# Patient Record
Sex: MALE | URBAN - METROPOLITAN AREA
[De-identification: names, ages, dates, MRNs, and addresses within clinical notes are randomized per-mention and may not be internally consistent; named-entity substitution may affect disease eponyms.]

---

## 2018-03-14 ENCOUNTER — OFFICE VISIT (OUTPATIENT)
Dept: INTERNAL MEDICINE CLINIC | Age: 43
End: 2018-03-14

## 2018-03-14 VITALS
HEIGHT: 70 IN | RESPIRATION RATE: 16 BRPM | HEART RATE: 89 BPM | BODY MASS INDEX: 28.77 KG/M2 | SYSTOLIC BLOOD PRESSURE: 184 MMHG | WEIGHT: 201 LBS | TEMPERATURE: 98.6 F | OXYGEN SATURATION: 96 % | DIASTOLIC BLOOD PRESSURE: 120 MMHG

## 2018-03-14 DIAGNOSIS — F10.20 UNCOMPLICATED ALCOHOL DEPENDENCE (HCC): ICD-10-CM

## 2018-03-14 DIAGNOSIS — Z00.00 WELL ADULT EXAM: Primary | ICD-10-CM

## 2018-03-14 DIAGNOSIS — F41.9 ANXIETY: ICD-10-CM

## 2018-03-14 DIAGNOSIS — I10 ESSENTIAL HYPERTENSION: ICD-10-CM

## 2018-03-14 RX ORDER — SERTRALINE HYDROCHLORIDE 50 MG/1
TABLET, FILM COATED ORAL
Qty: 30 TAB | Refills: 0 | Status: SHIPPED | OUTPATIENT
Start: 2018-03-14 | End: 2018-03-29 | Stop reason: ALTCHOICE

## 2018-03-14 RX ORDER — PROPRANOLOL HYDROCHLORIDE 10 MG/1
10 TABLET ORAL 3 TIMES DAILY
Qty: 90 TAB | Refills: 0 | Status: SHIPPED | OUTPATIENT
Start: 2018-03-14 | End: 2018-03-29

## 2018-03-14 RX ORDER — NALTREXONE HYDROCHLORIDE 50 MG/1
50 TABLET, FILM COATED ORAL DAILY
Qty: 30 TAB | Refills: 1 | Status: SHIPPED | OUTPATIENT
Start: 2018-03-14 | End: 2018-03-29 | Stop reason: SDUPTHER

## 2018-03-14 NOTE — PROGRESS NOTES
Marci Kohli is a 38 y.o. male and presents with Establish Care and Anxiety  . Subjective:  Pt presents for new patient visit. His wife, Livia Nava, just established with me and rec  to come to [facility]. anxiety  2 years ago pt reports he had a panic attack  Went to hospital thinking he was having a heart attack  pepsid given  Was at a very stressful job  Left prior postion due to [place] to do things decrased. He does not feel as motivated as before    Exercises through his work  Enjoys playing with his 1yo, 4yo and 10 yo  Sleeps 5-6 hours does not have problems going to sleep wakes up at 3 am due to prior schedule and can't go back to sleep. Coffee 2-4 cups Mon- Friday & Coffee 6 cups sat and Sunday likes the taste of it  When wakes ready to go    7.5 /10  Energy level 4/10  No sleep apnea but snores at night  Smoking for 20 years, etoh 3/night see SA profile  anxeity not so pervasive but now has become more of a problem    Plays with children all the time  Review of Systems  Constitutional: negative for fevers, chills, anorexia and weight loss  Eyes:   negative for visual disturbance and irritation  ENT:   negative for tinnitus,sore throat,nasal congestion,ear pains. hoarseness  Respiratory:  negative for cough, hemoptysis, dyspnea,wheezing  CV:   negative for chest pain, palpitations, lower extremity edema  GI:   negative for nausea, vomiting, diarrhea, abdominal pain,melena  Endo:               negative for polyuria,polydipsia,polyphagia,heat intolerance  Genitourinary: negative for frequency, dysuria and hematuria  Integument:  negative for rash and pruritus  Hematologic:  negative for easy bruising and gum/nose bleeding  Musculoskel: negative for myalgias, arthralgias, back pain, muscle weakness, joint pain  Neurological:  negative for headaches, dizziness, vertigo, memory problems and gait   Behavl/Psych: negative for feelings of anxiety, depression, mood changes    History reviewed. No pertinent past medical history.   Past Surgical History:   Procedure Laterality Date    HX ORTHOPAEDIC      knee surgery     Social History     Social History    Marital status: UNKNOWN     Spouse name: N/A    Number of children: N/A    Years of education: N/A     Social History Main Topics    Smoking status: Current Every Day Smoker     Packs/day: 1.00     Years: 20.00     Types: Cigarettes    Smokeless tobacco: Never Used    Alcohol use 1.8 oz/week     3 Cans of beer per week      Comment: 3 beers/day, 5 on friday and sat    Drug use: No    Sexual activity: Yes     Partners: Female     Other Topics Concern    None     Social History Narrative    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    Landscaping             wife healthy Isauro Kraus    3 children : 2, 3, 5yo     Family History   Problem Relation Age of Onset    Thyroid Disease Mother     Migraines Sister     Anxiety Sister        No Known Allergies    Objective:  Visit Vitals    BP (!) 184/120 (BP 1 Location: Left arm, BP Patient Position: Sitting)    Pulse 89    Temp 98.6 °F (37 °C) (Oral)    Resp 16    Ht 5' 10\" (1.778 m)    Wt 201 lb (91.2 kg)    SpO2 96%    BMI 28.84 kg/m2     Physical Exam:   General appearance - alert, well appearing, and in no distress  Mental status - alert, oriented to person, place, and time  EYE-FRANK, EOMI, corneas normal, no foreign bodies, visual acuity normal both eyes, no periorbital cellulitis  ENT-ENT exam normal, no neck nodes or sinus tenderness  Nose - normal and patent, no erythema, discharge or polyps  Mouth - mucous membranes moist, pharynx normal without lesions  Neck - supple, no significant adenopathy   Chest - clear to auscultation, no wheezes, rales or rhonchi, symmetric air entry   Heart - normal rate, regular rhythm, normal S1, S2, no murmurs, rubs, clicks or gallops   Abdomen - soft, nontender, nondistended, no masses or organomegaly  Lymph- no adenopathy palpable  Ext-peripheral pulses normal, no pedal edema, no clubbing or cyanosis  Skin-Warm and dry. no hyperpigmentation, vitiligo, or suspicious lesions  Neuro -alert, oriented, normal speech, no focal findings or movement disorder noted  Neck-normal C-spine, no tenderness, full ROM without pain      No results found for this or any previous visit. 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 Well adult exam  -     CBC W/O DIFF  -     METABOLIC PANEL, COMPREHENSIVE  -     LIPID PANEL  -     TSH 3RD GENERATION    Aside from pt's physical exam I spent 40 min with pt and > 50% of the time was spent in management and counseling regarding pts anxiety not treated as well as etoh use and new dx of hypertension. I discussed with pt that he has anxiety and would like to treat with CBT and rx meds. Hopefully can eventually get him off meds. discssed CBT does very well for anxiety and he is cognitvely based and already doing some self talk thru sitautions so hopefully can pursue this. I did discuss with him that his sleep is critical right now and the beer, coffee and smoking contributing to likely broken REM with resulting fatigue. As a result will try start of sertraline for anxieyt and start his weaning process for his etoh. I discussed the lim method in detail and he is very interested in trying. He has tried to stop etoh but has significant cravings.  disucussed with pt this method and will try. Will start sertraline first and if no side effects will try the naltrexone. He needs follow up in 2-3 weeks. HTN, new dx. Will do labs. disucssed etoh and smoking can cause labile htn. Will treat with propanolol to addres bp and anxiety sx. Will work on cig and caffeine over time once BP, anxiety and etoh more stabilized. 2. Essential hypertension  -     propranolol (INDERAL) 10 mg tablet; Take 1 Tab by mouth three (3) times daily. Indications: hypertension    3. Anxiety  -     sertraline (ZOLOFT) 50 mg tablet; Take 1/2 tablet for 4 days then increase to 1 tablet if tolerated  -     propranolol (INDERAL) 10 mg tablet; Take 1 Tab by mouth three (3) times daily. Indications: hypertension    4. Uncomplicated alcohol dependence (HCC)  -     naltrexone (DEPADE) 50 mg tablet; Take 1 Tab by mouth daily. 1-2 hours prior to etoh  Indications: alcoholism    2-3 week followup    This note will not be viewable in [place].

## 2018-03-14 NOTE — MR AVS SNAPSHOT
Brandy Seay 
 
 
 [address] 
[phone] Patient: Sohail Kaye MRN: [id number] :[id number] Visit Information Date & Time Provider Department Dept. Phone Encounter #  
 3/14/2018  1:40 PM Tanisha Puentes MD [facility] [address]  [id number] Upcoming Health Maintenance Date Due DTaP/Tdap/Td series (1 - Tdap) 1998 Influenza Age 5 to Adult 2017 Allergies as of 3/14/2018  Review Complete On: 3/14/2018 By: Tanisha Puentes MD  
 No Known Allergies Current Immunizations  Never Reviewed No immunizations on file. Not reviewed this visit You Were Diagnosed With   
  
 Codes Comments Well adult exam    -  Primary ICD-10-CM: Z00.00 ICD-9-CM: V70.0 Essential hypertension     ICD-10-CM: I10 
ICD-9-CM: 401.9 Anxiety     ICD-10-CM: F41.9 ICD-9-CM: 300.00 Uncomplicated alcohol dependence ([facility] [address]     ICD-10-CM: F10.20 ICD-9-CM: 303.90 Vitals BP Pulse Temp Resp Height(growth percentile) Weight(growth percentile) (!) 184/120 (BP 1 Location: Left arm, BP Patient Position: Sitting) 89 98.6 °F (37 °C) (Oral) 16 5' 10\" (1.778 m) 201 lb (91.2 kg) SpO2 BMI Smoking Status 96% 28.84 kg/m2 Current Every Day Smoker Vitals History BMI and BSA Data Body Mass Index Body Surface Area  
 28.84 kg/m 2 2.12 m 2 Preferred Pharmacy Pharmacy Name Phone [id number] - [address], [address] [facility] [phone] Your Updated Medication List  
  
   
This list is accurate as of 3/14/18  3:07 PM.  Always use your most recent med list.  
  
  
  
  
 naltrexone 50 mg tablet Commonly known as:  DEPADE Take 1 Tab by mouth daily. 1-2 hours prior to etoh  Indications: alcoholism  
  
 propranolol 10 mg tablet Commonly known as:  INDERAL  
 Take 1 Tab by mouth three (3) times daily. Indications: hypertension  
  
 sertraline 50 mg tablet Commonly known as:  ZOLOFT Take 1/2 tablet for 4 days then increase to 1 tablet if tolerated Prescriptions Sent to Pharmacy Refills  
 naltrexone (DEPADE) 50 mg tablet 1 Sig: Take 1 Tab by mouth daily. 1-2 hours prior to etoh  Indications: alcoholism Class: Normal  
 Pharmacy: [address], [address] [facility] Ph #: [phone] Route: Oral  
 sertraline (ZOLOFT) 50 mg tablet 0 Sig: Take 1/2 tablet for 4 days then increase to 1 tablet if tolerated Class: Normal  
 Pharmacy: [id number] - [address] [place] Ph #: [phone]  
 propranolol (INDERAL) 10 mg tablet 0 Sig: Take 1 Tab by mouth three (3) times daily. Indications: hypertension Class: Normal  
 Pharmacy: [place] [address] [facility], [address] [facility] Ph #: [phone] Route: Oral  
  
We Performed the Following CBC W/O DIFF [79268 CPT(R)] LIPID PANEL [92683 CPT(R)] METABOLIC PANEL, COMPREHENSIVE [02031 CPT(R)] TSH 3RD GENERATION [34768 CPT(R)] Patient Instructions Naltrexone (By mouth) Naltrexone (nal-TREX-one) Helps prevent alcohol or drug abuse relapse. Brand Name(s): Revia There may be other brand names for this medicine. When This Medicine Should Not Be Used: This medicine is not right for everyone. Do not use it if you had an allergic reaction to naltrexone, or if you are currently using narcotic medicine. How to Use This Medicine:  
Tablet · Take your medicine as directed. Your dose may need to be changed several times to find what works best for you. · Missed dose: Take a dose as soon as you remember. If it is almost time for your next dose, wait until then and take a regular dose. Do not take extra medicine to make up for a missed dose. · Store the medicine in a closed container at room temperature, away from heat, moisture, and direct light. Drugs and Foods to Avoid: Ask your doctor or pharmacist before using any other medicine, including over-the-counter medicines, vitamins, and herbal products. · Do not take this medicine if you are using or have used heroin or other narcotic drugs (such as buprenorphine, codeine, methadone, or other habit-forming painkillers) within the past 7 to 10 days. · Some foods and medicines can affect how naltrexone works. Tell your doctor if you are using disulfiram, thioridazine, or medicine for pain, diarrhea, cough, or colds. Warnings While Using This Medicine: · Tell your doctor if you are pregnant, planning to become pregnant, or breastfeeding, or if you have kidney disease, liver disease (including hepatitis B or C, cirrhosis), or a history of depression or mental illness. · You have a higher risk of accidental overdose, serious injury, or death if you use heroin or any other narcotic medicine while you are being treated with naltrexone. Also, naltrexone prevents you from feeling the effects of heroin if you use it. · This medicine may increase thoughts of suicide. Tell your doctor right away if you start to feel depressed or have thoughts about hurting yourself. · This medicine may cause liver problems. · This medicine may make you dizzy or drowsy. Do not drive or do anything else that could be dangerous until you know how this medicine affects you. · Tell your doctor and all other caregivers that you are taking naltrexone. You may need to carry a card to let others know you are taking this medicine if you have a medical emergency. Ask your doctor about this. · Your doctor will do lab tests at regular visits to check on the effects of this medicine. Keep all appointments. Possible Side Effects While Using This Medicine:  
Call your doctor right away if you notice any of these side effects: · Allergic reaction: Itching or hives, swelling in your face or hands, swelling or tingling in your mouth or throat, chest tightness, trouble breathing · Anxiety, confusion, depression, or unusual thoughts and behaviors · Dark urine or pale stools, nausea, vomiting, loss of appetite, stomach pain, or yellow skin or eyes · Trouble sleeping, getting upset easily, a big increase in energy, or reckless behavior If you notice these less serious side effects, talk with your doctor: · Constipation, diarrhea, stomach cramps or pain · Dizziness or drowsiness · Headache · Joint or muscle pain If you notice other side effects that you think are caused by this medicine, tell your doctor. Call your doctor for medical advice about side effects. You may report side effects to FDA at 9-727-FDA-8064 © 2017 [address]  Information is for End User's use only and may not be sold, redistributed or otherwise used for commercial purposes. The above information is an  only. It is not intended as medical advice for individual conditions or treatments. Talk to your doctor, nurse or pharmacist before following any medical regimen to see if it is safe and effective for you. Propranolol (By mouth) Propranolol (tonr-CBBB-jj-lol) Treats high blood pressure, angina, and atrial fibrillation (uneven heartbeat). Also prevents migraine headaches and treats tremors and proliferating infantile hemangioma. This medicine is a beta-blocker. Brand Name(s): Hemangeol, Inderal LA, Inderal XL, InnoPran XL There may be other brand names for this medicine. When This Medicine Should Not Be Used: This medicine is not right for everyone. Do not use it if you had an allergic reaction to propranolol, or if you have asthma or certain heart problems. Hemangeol should not be given to children weighing less than 2 kilograms or to premature babies younger than 11weeks of age.  It should not be given to children who are vomiting or not eating, have pheochromocytoma, or have a history of asthma or a breathing problem. How to Use This Medicine:  
Long Acting Capsule, Liquid, Tablet · Take your medicine as directed. Your dose may need to be changed several times to find what works best for you. · Swallow the extended-release capsule whole. Do not crush, break, or chew it. · Extended-release capsule: Take at bedtime. This medicine may be taken with or without food, but always take it the same way each time. · Oral liquid: Measure your dose with the dropper that comes with the package. You may mix the liquid with water or juice to make it easier to swallow. · Hemangeol oral liquid: Measure the dose with the dosing syringe that comes with the package. The medicine should be given directly into the child's mouth 2 times a day (at least 9 hours apart). It is given during or right after eating or breastfeeding. Do not administer the dose if the child is not eating or vomiting. It may also be mixed with milk or fruit juice and given in a baby's bottle. Do not shake before use. · This medicine should come with a Medication Guide. Ask your pharmacist for a copy if you do not have one. · Missed dose: Take a dose as soon as you remember. If it is almost time for your next dose, wait until then and take a regular dose. Do not take extra medicine to make up for a missed dose. · Store the medicine in a closed container at room temperature, away from heat, moisture, and direct light. Throw away any unused Hemangeol after 2 months. Drugs and Foods to Avoid: Ask your doctor or pharmacist before using any other medicine, including over-the-counter medicines, vitamins, and herbal products. · Some medicines can affect how propranolol works.  Tell your doctor if you are using any of the following medicines: 
¨ Bupropion, chlorpromazine, cimetidine, ciprofloxacin, digoxin, dobutamine, epinephrine, fluconazole, fluoxetine, montelukast, phenobarbital, phenytoin, rifampin, theophylline, thioridazine, or ticlopidine ¨ Other blood pressure medicine (clonidine, diltiazem, nicardipine, nifedipine, nisoldipine, prazosin, verapamil), medicine for heart rhythm problems (propafenone, quinidine), medicine to lower cholesterol (cholestyramine, colestipol, lovastatin, pravastatin), medicine to treat migraine headaches (rizatriptan, zolmitriptan), a steroid medicine, an MAO inhibitor, medicine to treat depression, NSAID pain or arthritis medicine (aspirin, diclofenac, ibuprofen, naproxen, celecoxib), or warfarin Warnings While Using This Medicine: · Tell your doctor if you are pregnant or breastfeeding, or if you have kidney disease, liver disease, angina (chest pain), heart failure, breathing problems, diabetes, glaucoma, or an overactive thyroid. Tell your doctor if you have Sravan-Parkinson-White syndrome or a history of severe allergic reactions. · This medicine may cause the following problems: ¨ Worsening of chest pain, heart attack (if treatment is stopped suddenly) ¨ Heart failure ¨ Low blood sugar levels ¨ An increased risk of stroke in children with PHACE syndrome (severe blood vessel problems in the brain) · Do not stop using this medicine suddenly. Your doctor will need to slowly decrease your dose before you stop it completely. · This medicine may make you dizzy, drowsy, or lightheaded. Do not drive or do anything else that could be dangerous until you know how this medicine affects you. · Tell any doctor or dentist who treats you that you are using this medicine. · Your doctor will check your progress and the effects of this medicine at regular visits. Keep all appointments. · Keep all medicine out of the reach of children. Never share your medicine with anyone. Possible Side Effects While Using This Medicine: Call your doctor right away if you notice any of these side effects: · Allergic reaction: Itching or hives, swelling in your face or hands, swelling or tingling in your mouth or throat, chest tightness, trouble breathing · Blistering, peeling, or red skin rash · Chest pain · Lightheadedness, dizziness, or fainting · Rapid weight gain, swelling in your hands, ankles, or feet, trouble breathing, tiredness · Slow, fast, or uneven heartbeat · Trouble breathing or wheezing If you notice these less serious side effects, talk with your doctor: · Change in sleeping patterns (in children) · Diarrhea (in children) · Feeling agitated or irritable, unusual dreams (in children) · Feeling sleepy or drowsy (in children) If you notice other side effects that you think are caused by this medicine, tell your doctor. Call your doctor for medical advice about side effects. You may report side effects to FDA at 2-573-FDA-1762 © 2017 [address] Information is for End User's use only and may not be sold, redistributed or otherwise used for commercial purposes. The above information is an  only. It is not intended as medical advice for individual conditions or treatments. Talk to your doctor, nurse or pharmacist before following any medical regimen to see if it is safe and effective for you. Learning About Anxiety Disorders What are anxiety disorders? Anxiety disorders are a type of medical problem. They cause severe anxiety. When you feel anxious, you feel that something bad is about to happen. This feeling interferes with your life. These disorders include: · Generalized anxiety disorder. You feel worried and stressed about many everyday events and activities. This goes on for several months and disrupts your life on most days. · Panic disorder. You have repeated panic attacks. A panic attack is a sudden, intense fear or anxiety. It may make you feel short of breath. Your heart may pound. · Social anxiety disorder. You feel very anxious about what you will say or do in front of people. For example, you may be scared to talk or eat in public. This problem affects your daily life. · Phobias. You are very scared of a specific object, situation, or activity. For example, you may fear spiders, high places, or small spaces. What are the symptoms? Generalized anxiety disorder Symptoms may include: · Feeling worried and stressed about many things almost every day. · Feeling tired or irritable. You may have a hard time concentrating. · Having headaches or muscle aches. · Having a hard time getting to sleep or staying asleep. Panic disorder You may have repeated panic attacks when there is no reason for feeling afraid. You may change your daily activities because you worry that you will have another attack. Symptoms may include: 
· Intense fear, terror, or anxiety. · Trouble breathing or very fast breathing. · Chest pain or tightness. · A heartbeat that races or is not regular. Social anxiety disorder Symptoms may include: · Fear about a social situation, such as eating in front of others or speaking in public. You may worry a lot. Or you may be afraid that something bad will happen. · Anxiety that can cause you to blush, sweat, and feel shaky. · A heartbeat that is faster than normal. 
· A hard time focusing. Phobias Symptoms may include: · More fear than most people of being around an object, being in a situation, or doing an activity. You might also be stressed about the chance of being around the thing you fear. · Worry about losing control, panicking, fainting, or having physical symptoms like a faster heartbeat when you are around the situation or object. How are these disorders treated? Anxiety disorders can be treated with medicines or counseling. A combination of both may be used. Medicines may include: · Antidepressants. These may help your symptoms by keeping chemicals in your brain in balance. · Benzodiazepines. These may give you short-term relief of your symptoms. Some people use cognitive-behavioral therapy. A therapist helps you learn to change stressful or bad thoughts into helpful thoughts. Lead a healthy lifestyle A healthy lifestyle may help you feel better. · Get at least 30 minutes of exercise on most days of the week. Walking is a good choice. · Eat a healthy diet. Include fruits, vegetables, lean proteins, and whole grains in your diet each day. · Try to go to bed at the same time every night. Try for 8 hours of sleep a night. · Find ways to manage stress. Try relaxation exercises. · Avoid alcohol and illegal drugs. Follow-up care is a key part of your treatment and safety. Be sure to make and go to all appointments, and call your doctor if you are having problems. It's also a good idea to know your test results and keep a list of the medicines you take. Where can you learn more? Go to http://shima-jani.info/. Enter F196 in the search box to learn more about \"Learning About Anxiety Disorders. \" Current as of: May 12, 2017 Content Version: 11.4 © 7918-4056 Healthwise, [employer]. Care instructions adapted under license by [employer] (which disclaims liability or warranty for this information). If you have questions about a medical condition or this instruction, always ask your healthcare professional. [place] [address] any warranty or liability for your use of this information. Introducing \[facility] [place] [facility]\"" & HEALTH SERVICES! [employer] introduces [employer] patient portal. Now you can access parts of your medical record, email your doctor's office, and request medication refills online. 1. In your internet browser, go to [email or web address] 2. Click on the First Time User? Click Here link in the Sign In box.  You will see the New Member Sign Up page. 3. Enter your [employer] Access Code exactly as it appears below. You will not need to use this code after youve completed the sign-up process. If you do not sign up before the expiration date, you must request a new code. · [employer] Access Code: [email or web address] Expires: 6/12/2018  3:00 PM 
 
4. Enter the last four digits of your Social Security Number (xxxx) and Date of Birth (mm/dd/yyyy) as indicated and click Submit. You will be taken to the next sign-up page. 5. Create a [email or web address] ID. This will be your [employer] login ID and cannot be changed, so think of one that is secure and easy to remember. 6. Create a [employer] password. You can change your password at any time. 7. Enter your Password Reset Question and Answer. This can be used at a later time if you forget your password. 8. Enter your e-mail address. You will receive e-mail notification when new information is available in [address]. 9. Click Sign Up. You can now view and download portions of your medical record. 10. Click the Download Summary menu link to download a portable copy of your medical information. If you have questions, please visit the Frequently Asked Questions section of the [employer] website. Remember, [employer] is NOT to be used for urgent needs. For medical emergencies, dial 911. Now available from your iPhone and Android! Please provide this summary of care documentation to your next provider. If you have any questions after today's visit, please call [phone].

## 2018-03-14 NOTE — PATIENT INSTRUCTIONS
Naltrexone (By mouth)   Naltrexone (nal-TREX-one)  Helps prevent alcohol or drug abuse relapse. Brand Name(s): Chi   There may be other brand names for this medicine. When This Medicine Should Not Be Used: This medicine is not right for everyone. Do not use it if you had an allergic reaction to naltrexone, or if you are currently using narcotic medicine. How to Use This Medicine:   Tablet  · Take your medicine as directed. Your dose may need to be changed several times to find what works best for you. · Missed dose: Take a dose as soon as you remember. If it is almost time for your next dose, wait until then and take a regular dose. Do not take extra medicine to make up for a missed dose. · Store the medicine in a closed container at room temperature, away from heat, moisture, and direct light. Drugs and Foods to Avoid:   Ask your doctor or pharmacist before using any other medicine, including over-the-counter medicines, vitamins, and herbal products. · Do not take this medicine if you are using or have used heroin or other narcotic drugs (such as buprenorphine, codeine, methadone, or other habit-forming painkillers) within the past 7 to 10 days. · Some foods and medicines can affect how naltrexone works. Tell your doctor if you are using disulfiram, thioridazine, or medicine for pain, diarrhea, cough, or colds. Warnings While Using This Medicine:   · Tell your doctor if you are pregnant, planning to become pregnant, or breastfeeding, or if you have kidney disease, liver disease (including hepatitis B or C, cirrhosis), or a history of depression or mental illness. · You have a higher risk of accidental overdose, serious injury, or death if you use heroin or any other narcotic medicine while you are being treated with naltrexone. Also, naltrexone prevents you from feeling the effects of heroin if you use it. · This medicine may increase thoughts of suicide.  Tell your doctor right away if you start to feel depressed or have thoughts about hurting yourself. · This medicine may cause liver problems. · This medicine may make you dizzy or drowsy. Do not drive or do anything else that could be dangerous until you know how this medicine affects you. · Tell your doctor and all other caregivers that you are taking naltrexone. You may need to carry a card to let others know you are taking this medicine if you have a medical emergency. Ask your doctor about this. · Your doctor will do lab tests at regular visits to check on the effects of this medicine. Keep all appointments. Possible Side Effects While Using This Medicine:   Call your doctor right away if you notice any of these side effects:  · Allergic reaction: Itching or hives, swelling in your face or hands, swelling or tingling in your mouth or throat, chest tightness, trouble breathing  · Anxiety, confusion, depression, or unusual thoughts and behaviors  · Dark urine or pale stools, nausea, vomiting, loss of appetite, stomach pain, or yellow skin or eyes  · Trouble sleeping, getting upset easily, a big increase in energy, or reckless behavior  If you notice these less serious side effects, talk with your doctor:   · Constipation, diarrhea, stomach cramps or pain  · Dizziness or drowsiness  · Headache  · Joint or muscle pain  If you notice other side effects that you think are caused by this medicine, tell your doctor. Call your doctor for medical advice about side effects. You may report side effects to FDA at 9-468-FDA-2492  © 2017 [facility] Information is for End User's use only and may not be sold, redistributed or otherwise used for commercial purposes. The above information is an  only. It is not intended as medical advice for individual conditions or treatments. Talk to your doctor, nurse or pharmacist before following any medical regimen to see if it is safe and effective for you.   Propranolol (By mouth)   Propranolol (zcsw-VHTO-dz-lol)  Treats high blood pressure, angina, and atrial fibrillation (uneven heartbeat). Also prevents migraine headaches and treats tremors and proliferating infantile hemangioma. This medicine is a beta-blocker. Brand Name(s): Hemangeol, Inderal LA, Inderal XL, InnoPran XL   There may be other brand names for this medicine. When This Medicine Should Not Be Used: This medicine is not right for everyone. Do not use it if you had an allergic reaction to propranolol, or if you have asthma or certain heart problems. Hemangeol should not be given to children weighing less than 2 kilograms or to premature babies younger than 11weeks of age. It should not be given to children who are vomiting or not eating, have pheochromocytoma, or have a history of asthma or a breathing problem. How to Use This Medicine:   Long Acting Capsule, Liquid, Tablet  · Take your medicine as directed. Your dose may need to be changed several times to find what works best for you. · Swallow the extended-release capsule whole. Do not crush, break, or chew it. · Extended-release capsule: Take at bedtime. This medicine may be taken with or without food, but always take it the same way each time. · Oral liquid: Measure your dose with the dropper that comes with the package. You may mix the liquid with water or juice to make it easier to swallow. · Hemangeol oral liquid: Measure the dose with the dosing syringe that comes with the package. The medicine should be given directly into the child's mouth 2 times a day (at least 9 hours apart). It is given during or right after eating or breastfeeding. Do not administer the dose if the child is not eating or vomiting. It may also be mixed with milk or fruit juice and given in a baby's bottle. Do not shake before use. · This medicine should come with a Medication Guide. Ask your pharmacist for a copy if you do not have one. · Missed dose: Take a dose as soon as you remember.  If it is almost time for your next dose, wait until then and take a regular dose. Do not take extra medicine to make up for a missed dose. · Store the medicine in a closed container at room temperature, away from heat, moisture, and direct light. Throw away any unused Hemangeol after 2 months. Drugs and Foods to Avoid:   Ask your doctor or pharmacist before using any other medicine, including over-the-counter medicines, vitamins, and herbal products. · Some medicines can affect how propranolol works. Tell your doctor if you are using any of the following medicines:  ¨ Bupropion, chlorpromazine, cimetidine, ciprofloxacin, digoxin, dobutamine, epinephrine, fluconazole, fluoxetine, montelukast, phenobarbital, phenytoin, rifampin, theophylline, thioridazine, or ticlopidine  ¨ Other blood pressure medicine (clonidine, diltiazem, nicardipine, nifedipine, nisoldipine, prazosin, verapamil), medicine for heart rhythm problems (propafenone, quinidine), medicine to lower cholesterol (cholestyramine, colestipol, lovastatin, pravastatin), medicine to treat migraine headaches (rizatriptan, zolmitriptan), a steroid medicine, an MAO inhibitor, medicine to treat depression, NSAID pain or arthritis medicine (aspirin, diclofenac, ibuprofen, naproxen, celecoxib), or warfarin  Warnings While Using This Medicine:   · Tell your doctor if you are pregnant or breastfeeding, or if you have kidney disease, liver disease, angina (chest pain), heart failure, breathing problems, diabetes, glaucoma, or an overactive thyroid. Tell your doctor if you have Sravan-Parkinson-White syndrome or a history of severe allergic reactions. · This medicine may cause the following problems:  ¨ Worsening of chest pain, heart attack (if treatment is stopped suddenly)  ¨ Heart failure  ¨ Low blood sugar levels  ¨ An increased risk of stroke in children with PHACE syndrome (severe blood vessel problems in the brain)  · Do not stop using this medicine suddenly.  Your doctor will need to slowly decrease your dose before you stop it completely. · This medicine may make you dizzy, drowsy, or lightheaded. Do not drive or do anything else that could be dangerous until you know how this medicine affects you. · Tell any doctor or dentist who treats you that you are using this medicine. · Your doctor will check your progress and the effects of this medicine at regular visits. Keep all appointments. · Keep all medicine out of the reach of children. Never share your medicine with anyone. Possible Side Effects While Using This Medicine:   Call your doctor right away if you notice any of these side effects:  · Allergic reaction: Itching or hives, swelling in your face or hands, swelling or tingling in your mouth or throat, chest tightness, trouble breathing  · Blistering, peeling, or red skin rash  · Chest pain  · Lightheadedness, dizziness, or fainting  · Rapid weight gain, swelling in your hands, ankles, or feet, trouble breathing, tiredness  · Slow, fast, or uneven heartbeat  · Trouble breathing or wheezing  If you notice these less serious side effects, talk with your doctor:   · Change in sleeping patterns (in children)  · Diarrhea (in children)  · Feeling agitated or irritable, unusual dreams (in children)  · Feeling sleepy or drowsy (in children)  If you notice other side effects that you think are caused by this medicine, tell your doctor. Call your doctor for medical advice about side effects. You may report side effects to FDA at 9-554-FDA-3766  © 2017 [address] Information is for End User's use only and may not be sold, redistributed or otherwise used for commercial purposes. The above information is an  only. It is not intended as medical advice for individual conditions or treatments. Talk to your doctor, nurse or pharmacist before following any medical regimen to see if it is safe and effective for you.        Learning About Anxiety Disorders  What are anxiety disorders? Anxiety disorders are a type of medical problem. They cause severe anxiety. When you feel anxious, you feel that something bad is about to happen. This feeling interferes with your life. These disorders include:  · Generalized anxiety disorder. You feel worried and stressed about many everyday events and activities. This goes on for several months and disrupts your life on most days. · Panic disorder. You have repeated panic attacks. A panic attack is a sudden, intense fear or anxiety. It may make you feel short of breath. Your heart may pound. · Social anxiety disorder. You feel very anxious about what you will say or do in front of people. For example, you may be scared to talk or eat in public. This problem affects your daily life. · Phobias. You are very scared of a specific object, situation, or activity. For example, you may fear spiders, high places, or small spaces. What are the symptoms? Generalized anxiety disorder  Symptoms may include:  · Feeling worried and stressed about many things almost every day. · Feeling tired or irritable. You may have a hard time concentrating. · Having headaches or muscle aches. · Having a hard time getting to sleep or staying asleep. Panic disorder  You may have repeated panic attacks when there is no reason for feeling afraid. You may change your daily activities because you worry that you will have another attack. Symptoms may include:  · Intense fear, terror, or anxiety. · Trouble breathing or very fast breathing. · Chest pain or tightness. · A heartbeat that races or is not regular. Social anxiety disorder  Symptoms may include:  · Fear about a social situation, such as eating in front of others or speaking in public. You may worry a lot. Or you may be afraid that something bad will happen. · Anxiety that can cause you to blush, sweat, and feel shaky.   · A heartbeat that is faster than normal.  · A hard time focusing. Phobias  Symptoms may include:  · More fear than most people of being around an object, being in a situation, or doing an activity. You might also be stressed about the chance of being around the thing you fear. · Worry about losing control, panicking, fainting, or having physical symptoms like a faster heartbeat when you are around the situation or object. How are these disorders treated? Anxiety disorders can be treated with medicines or counseling. A combination of both may be used. Medicines may include:  · Antidepressants. These may help your symptoms by keeping chemicals in your brain in balance. · Benzodiazepines. These may give you short-term relief of your symptoms. Some people use cognitive-behavioral therapy. A therapist helps you learn to change stressful or bad thoughts into helpful thoughts. Lead a healthy lifestyle  A healthy lifestyle may help you feel better. · Get at least 30 minutes of exercise on most days of the week. Walking is a good choice. · Eat a healthy diet. Include fruits, vegetables, lean proteins, and whole grains in your diet each day. · Try to go to bed at the same time every night. Try for 8 hours of sleep a night. · Find ways to manage stress. Try relaxation exercises. · Avoid alcohol and illegal drugs. Follow-up care is a key part of your treatment and safety. Be sure to make and go to all appointments, and call your doctor if you are having problems. It's also a good idea to know your test results and keep a list of the medicines you take. Where can you learn more? Go to http://shima-jani.info/. Enter P980 in the search box to learn more about \"Learning About Anxiety Disorders. \"  Current as of: May 12, 2017  Content Version: 11.4  © 2453-2411 [employer]. Care instructions adapted under license by [employer] (which disclaims liability or warranty for this information).  If you have questions about a medical condition or this instruction, always ask your healthcare professional. [place] [address] any warranty or liability for your use of this information.

## 2018-03-15 LAB
ALBUMIN SERPL-MCNC: 4.5 G/DL (ref 3.5–5.5)
ALBUMIN/GLOB SERPL: 1.9 {RATIO} (ref 1.2–2.2)
ALP SERPL-CCNC: 61 IU/L (ref 39–117)
ALT SERPL-CCNC: 38 IU/L (ref 0–44)
AST SERPL-CCNC: 32 IU/L (ref 0–40)
BILIRUB SERPL-MCNC: 0.5 MG/DL (ref 0–1.2)
BUN SERPL-MCNC: 7 MG/DL (ref 6–24)
BUN/CREAT SERPL: 9 (ref 9–20)
CALCIUM SERPL-MCNC: 9.7 MG/DL (ref 8.7–10.2)
CHLORIDE SERPL-SCNC: 99 MMOL/L (ref 96–106)
CHOLEST SERPL-MCNC: 184 MG/DL (ref 100–199)
CO2 SERPL-SCNC: 25 MMOL/L (ref 18–29)
CREAT SERPL-MCNC: 0.82 MG/DL (ref 0.76–1.27)
ERYTHROCYTE [DISTWIDTH] IN BLOOD BY AUTOMATED COUNT: 13.2 % (ref 12.3–15.4)
GFR SERPLBLD CREATININE-BSD FMLA CKD-EPI: 111 ML/MIN/1.73
GFR SERPLBLD CREATININE-BSD FMLA CKD-EPI: 128 ML/MIN/1.73
GLOBULIN SER CALC-MCNC: 2.4 G/DL (ref 1.5–4.5)
GLUCOSE SERPL-MCNC: 88 MG/DL (ref 65–99)
HCT VFR BLD AUTO: 44.8 % (ref 37.5–51)
HDLC SERPL-MCNC: 67 MG/DL
HGB BLD-MCNC: 15.7 G/DL (ref 13–17.7)
INTERPRETATION, 910389: NORMAL
LDLC SERPL CALC-MCNC: 101 MG/DL (ref 0–99)
MCH RBC QN AUTO: 34.5 PG (ref 26.6–33)
MCHC RBC AUTO-ENTMCNC: 35 G/DL (ref 31.5–35.7)
MCV RBC AUTO: 99 FL (ref 79–97)
PLATELET # BLD AUTO: 316 X10E3/UL (ref 150–379)
POTASSIUM SERPL-SCNC: 4.2 MMOL/L (ref 3.5–5.2)
PROT SERPL-MCNC: 6.9 G/DL (ref 6–8.5)
RBC # BLD AUTO: 4.55 X10E6/UL (ref 4.14–5.8)
SODIUM SERPL-SCNC: 141 MMOL/L (ref 134–144)
TRIGL SERPL-MCNC: 79 MG/DL (ref 0–149)
TSH SERPL DL<=0.005 MIU/L-ACNC: 1.11 UIU/ML (ref 0.45–4.5)
VLDLC SERPL CALC-MCNC: 16 MG/DL (ref 5–40)
WBC # BLD AUTO: 10.2 X10E3/UL (ref 3.4–10.8)

## 2018-03-23 ENCOUNTER — OFFICE VISIT (OUTPATIENT)
Dept: INTERNAL MEDICINE CLINIC | Age: 43
End: 2018-03-23

## 2018-03-23 VITALS
HEIGHT: 70 IN | DIASTOLIC BLOOD PRESSURE: 116 MMHG | HEART RATE: 73 BPM | WEIGHT: 202 LBS | TEMPERATURE: 98.2 F | SYSTOLIC BLOOD PRESSURE: 174 MMHG | BODY MASS INDEX: 28.92 KG/M2 | RESPIRATION RATE: 16 BRPM | OXYGEN SATURATION: 99 %

## 2018-03-23 DIAGNOSIS — D75.89 MACROCYTOSIS: ICD-10-CM

## 2018-03-23 DIAGNOSIS — L81.8 HISTORY OF BEING TATOOED: ICD-10-CM

## 2018-03-23 DIAGNOSIS — F41.9 ANXIETY: ICD-10-CM

## 2018-03-23 DIAGNOSIS — F10.10 ETOH ABUSE: ICD-10-CM

## 2018-03-23 DIAGNOSIS — I10 ESSENTIAL HYPERTENSION: Primary | ICD-10-CM

## 2018-03-23 RX ORDER — LORAZEPAM 0.5 MG/1
TABLET ORAL
Qty: 20 TAB | Refills: 0 | Status: SHIPPED | OUTPATIENT
Start: 2018-03-23 | End: 2018-04-20 | Stop reason: SDUPTHER

## 2018-03-23 RX ORDER — CLONIDINE HYDROCHLORIDE 0.1 MG/1
0.05 TABLET ORAL 2 TIMES DAILY
Qty: 30 TAB | Refills: 0 | Status: SHIPPED | OUTPATIENT
Start: 2018-03-23 | End: 2018-03-29 | Stop reason: SDUPTHER
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
## 2018-03-23 NOTE — PROGRESS NOTES
Randee Miranda is a 38 y.o. male    Chief Complaint   Patient presents with    Medication Problem       1. Have you been to the ER, urgent care clinic since your last visit? Hospitalized since your last visit? No    2. Have you seen or consulted any other health care providers outside of the [address] [place] [address] since your last visit? Include any pap smears or colon screening.   No    Visit Vitals    BP (!) 174/116 (BP 1 Location: Right arm, BP Patient Position: Sitting)    Pulse 73    Temp 98.2 °F (36.8 °C) (Oral)    Resp 16    Ht 5' 10\" (1.778 m)    Wt 202 lb (91.6 kg)    SpO2 99%    BMI 28.98 kg/m2
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
## 2018-03-25 LAB
FOLATE SERPL-MCNC: 13.2 NG/ML
HCV AB S/CO SERPL IA: <0.1 S/CO RATIO (ref 0–0.9)
TESTOST FREE SERPL-MCNC: 18.4 PG/ML (ref 6.8–21.5)
TESTOST SERPL-MCNC: 837 NG/DL (ref 264–916)
VIT B12 SERPL-MCNC: 457 PG/ML (ref 232–1245)

## 2018-03-29 ENCOUNTER — OFFICE VISIT (OUTPATIENT)
Dept: INTERNAL MEDICINE CLINIC | Age: 43
End: 2018-03-29

## 2018-03-29 VITALS
WEIGHT: 197.4 LBS | HEART RATE: 84 BPM | HEIGHT: 70 IN | RESPIRATION RATE: 18 BRPM | TEMPERATURE: 98.1 F | SYSTOLIC BLOOD PRESSURE: 135 MMHG | OXYGEN SATURATION: 98 % | DIASTOLIC BLOOD PRESSURE: 90 MMHG | BODY MASS INDEX: 28.26 KG/M2

## 2018-03-29 DIAGNOSIS — I10 ESSENTIAL HYPERTENSION: ICD-10-CM

## 2018-03-29 DIAGNOSIS — F10.20 UNCOMPLICATED ALCOHOL DEPENDENCE (HCC): ICD-10-CM

## 2018-03-29 DIAGNOSIS — F41.9 ANXIETY: Primary | ICD-10-CM

## 2018-03-29 RX ORDER — NALTREXONE HYDROCHLORIDE 50 MG/1
50 TABLET, FILM COATED ORAL DAILY
Qty: 30 TAB | Refills: 1 | Status: SHIPPED | OUTPATIENT
Start: 2018-03-29 | End: 2018-06-08 | Stop reason: SDUPTHER

## 2018-03-29 RX ORDER — CLONIDINE HYDROCHLORIDE 0.1 MG/1
0.1 TABLET ORAL 2 TIMES DAILY
Qty: 60 TAB | Refills: 3 | Status: SHIPPED | OUTPATIENT
Start: 2018-03-29 | End: 2018-07-06 | Stop reason: SDUPTHER

## 2018-03-29 NOTE — PROGRESS NOTES
Jairo Lemus is a 38 y.o. male    Chief Complaint   Patient presents with    Hypertension       1. Have you been to the ER, urgent care clinic since your last visit? Hospitalized since your last visit? No    2. Have you seen or consulted any other health care providers outside of the [address] [place] [address] since your last visit? Include any pap smears or colon screening.   No    Visit Vitals    BP (!) 166/103 (BP 1 Location: Left arm, BP Patient Position: Sitting)    Pulse 98    Temp 98.1 °F (36.7 °C) (Oral)    Resp 18    Ht 5' 10\" (1.778 m)    Wt 197 lb 6.4 oz (89.5 kg)    SpO2 98%    BMI 28.32 kg/m2

## 2018-03-29 NOTE — PROGRESS NOTES
Chief Complaint   Patient presents with    Hypertension       etoh  Since last visit he had 2 beers on Friday and 1 beer on Wed because he felt like he wanted a beer. Friday was with friends and on Wed got off early and did not take before going home at 3 pm. He does note the naltrexone works really well and no cravings at all. He reports his sweats at night are not as severe. He reports before was drenched and continuous but now slight amount. Last 2 nights very mild. He is taking ativan 2 nights and then 2 days. He took 1/2 pill when could not talk self   Does not feel depressed. Anxious at times    Children are   2, 5 and 6   She enjoys playing soccer and running around with them after work      Subjective:   Xavier Michel is a 38 y.o. male with hypertension. Hypertension ROS: taking medications as instructed, no medication side effects noted, no TIA's, no chest pain on exertion, no dyspnea on exertion, no swelling of ankles. New concerns: none. History reviewed. No pertinent past medical history.   Past Surgical History:   Procedure Laterality Date    HX ORTHOPAEDIC      knee surgery     Social History     Social History    Marital status: UNKNOWN     Spouse name: N/A    Number of children: N/A    Years of education: N/A     Social History Main Topics    Smoking status: Current Every Day Smoker     Packs/day: 1.00     Years: 20.00     Types: Cigarettes    Smokeless tobacco: Never Used    Alcohol use 1.8 oz/week     3 Cans of beer per week      Comment: 3 beers/day, 5 on friday and sat    Drug use: No    Sexual activity: Yes     Partners: Female     Other Topics Concern    None     Social History Narrative    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 30 Tab 0    LORazepam (ATIVAN) 0.5 mg tablet Take 1/2 to 1 tablet po bid only if needed do not drink etoh or other sedative meds 20 Tab 0    naltrexone (DEPADE) 50 mg tablet Take 1 Tab by mouth daily. 1-2 hours prior to etoh  Indications: alcoholism 30 Tab 1    sertraline (ZOLOFT) 50 mg tablet Take 1/2 tablet for 4 days then increase to 1 tablet if tolerated (Patient not taking: Reported on 3/23/2018) 30 Tab 0     No Known Allergies    Review of Systems - General ROS: positive for  - sleep disturbance  negative for - chills or fever  Cardiovascular ROS: no chest pain or dyspnea on exertion  Respiratory ROS: no cough, shortness of breath, or wheezing    Visit Vitals    BP (!) 166/103 (BP 1 Location: Left arm, BP Patient Position: Sitting)    Pulse 98    Temp 98.1 °F (36.7 °C) (Oral)    Resp 18    Ht 5' 10\" (1.778 m)    Wt 197 lb 6.4 oz (89.5 kg)    SpO2 98%    BMI 28.32 kg/m2     General Appearance:  Well developed, well nourished,alert and oriented x 3, and individual in no acute distress. Ears/Nose/Mouth/Throat:   Hearing grossly normal.         Neck: Supple, no lad, no bruits   Chest:   Lungs clear to auscultation bilaterally. Cardiovascular:  Regular rate and rhythm, S1, S2 normal, no murmur. Abdomen:   Soft, non-tender, bowel sounds are active. Extremities: No edema bilaterally. Skin: Warm and dry, no suspicious lesions                 Diagnoses and all orders for this visit:    1. Anxiety  Discussed use of ativan  He is using very rarely. He is doing a lot of self talk which seems to help him. He learned this himself to deal with situation.   Discussed concern he may need ssri or rx for anxiety and on talking with him he would like to do behavioral things such as self cbt and spending time with kids and wife  Will have [facility] Pierce[facility] assess to see if he may need rx    2. Essential hypertension  Cont meds  Not optimally controlled  Recovering from etoh and need body and cv tree to readjust  Will continue to monitor   On recheck 135/90  -     cloNIDine HCl (CATAPRES) 0.1 mg tablet; Take 1 Tab by mouth two (2) times a day. 3. Uncomplicated alcohol dependence ([address]  Goal to not drink for a full week  Pt to [facility] me to see how he does  When takes naltrexone, no craving at all  Asked pt to set phone alarm to take meds early enough in advance    -     REFERRAL TO PSYCHIATRY  -     naltrexone (DEPADE) 50 mg tablet; Take 1 Tab by mouth daily. 1-2 hours prior to etoh  Indications: alcoholism    This note will not be viewable in [facility].

## 2018-03-29 NOTE — MR AVS SNAPSHOT
[facility] 
 
 
 [address] [place] [address] 
[phone] Patient: Darlene Umana MRN: [id number] :1975 Visit Information Date & Time Provider Department Dept. Phone Encounter #  
 3/29/2018  8:00 AM Moira Rubin MD [facility] [address] [facility] [id number] Your Appointments 2018  2:00 PM  
ROUTINE CARE with Moira Rubin MD  
[facility] [address]) Appt Note: 2 wk [address] [place] [address] [id number]  
[phone]  
  
   
 [address] [facility] [address] [facility] [address] [place] [address] [facility] [id number] Upcoming Health Maintenance Date Due Pneumococcal 19-64 Medium Risk (1 of 1 - PPSV23) 1996 DTaP/Tdap/Td series (1 - Tdap) 1998 Influenza Age 5 to Adult 2017 Allergies as of 3/29/2018  Review Complete On: 3/29/2018 By: Moira Rubin MD  
 No Known Allergies Current Immunizations  Never Reviewed No immunizations on file. Not reviewed this visit You Were Diagnosed With   
  
 Codes Comments Anxiety    -  Primary ICD-10-CM: F41.9 ICD-9-CM: 300.00 Essential hypertension     ICD-10-CM: I10 
ICD-9-CM: 401.9 Uncomplicated alcohol dependence ([facility] [address]     ICD-10-CM: F10.20 ICD-9-CM: 303.90 Vitals BP Pulse Temp Resp Height(growth percentile) Weight(growth percentile) (!) 166/103 (BP 1 Location: Left arm, BP Patient Position: Sitting) 98 98.1 °F (36.7 °C) (Oral) 18 5' 10\" (1.778 m) 197 lb 6.4 oz (89.5 kg) SpO2 BMI Smoking Status 98% 28.32 kg/m2 Current Every Day Smoker Vitals History BMI and BSA Data Body Mass Index Body Surface Area  
 28.32 kg/m 2 2.1 m 2 Preferred Pharmacy Pharmacy Name Phone [id number] - [address], 1 [facility] [phone] Your Updated Medication List  
  
   
 This list is accurate as of 3/29/18  8:44 AM.  Always use your most recent med list.  
  
  
  
  
 cloNIDine HCl 0.1 mg tablet Commonly known as:  CATAPRES Take 1 Tab by mouth two (2) times a day. LORazepam 0.5 mg tablet Commonly known as:  ATIVAN Take 1/2 to 1 tablet po bid only if needed do not drink etoh or other sedative meds  
  
 naltrexone 50 mg tablet Commonly known as:  DEPADE Take 1 Tab by mouth daily. 1-2 hours prior to etoh  Indications: alcoholism Prescriptions Printed Refills  
 naltrexone (DEPADE) 50 mg tablet 1 Sig: Take 1 Tab by mouth daily. 1-2 hours prior to etoh  Indications: alcoholism Class: Print Route: Oral  
  
Prescriptions Sent to Pharmacy Refills  
 cloNIDine HCl (CATAPRES) 0.1 mg tablet 3 Sig: Take 1 Tab by mouth two (2) times a day. Class: Normal  
 Pharmacy: [facility] [address], [address] [place] [address] #: [phone] Route: Oral  
  
We Performed the Following REFERRAL TO PSYCHIATRY [REF91 Custom] Comments:  
 etoh management, doing very well would like more support Referral Information Referral ID Referred By Referred To  
  
 [id number] Erick GUTIERRES Not Available Visits Status Start Date End Date 1 New Request 3/29/18 3/29/19 If your referral has a status of pending review or denied, additional information will be sent to support the outcome of this decision. [facility]! Dear Aracely Long: 
Thank you for requesting a [employer] account. Our records indicate that you already have an active [employer] account. You can access your account anytime at [email or web address] Did you know that you can access your hospital and ER discharge instructions at any time in [employer]? You can also review all of your test results from your hospital stay or ER visit. Additional Information If you have questions, please visit the Frequently Asked Questions section of the MyChart website at [email or web address] Remember, [employer] is NOT to be used for urgent needs. For medical emergencies, dial 911. Now available from your iPhone and Android! Please provide this summary of care documentation to your next provider. If you have any questions after today's visit, please call [phone].

## 2018-04-19 ENCOUNTER — OFFICE VISIT (OUTPATIENT)
Dept: INTERNAL MEDICINE CLINIC | Age: 43
End: 2018-04-19

## 2018-04-19 DIAGNOSIS — F41.1 GENERALIZED ANXIETY DISORDER WITH PANIC ATTACKS: Primary | ICD-10-CM

## 2018-04-19 DIAGNOSIS — F10.21 ALCOHOL USE DISORDER, MODERATE, IN EARLY REMISSION (HCC): ICD-10-CM

## 2018-04-19 DIAGNOSIS — F41.0 GENERALIZED ANXIETY DISORDER WITH PANIC ATTACKS: Primary | ICD-10-CM

## 2018-04-19 NOTE — PROGRESS NOTES
Behavioral Health Note    This patient was referred to the [address] [place] [address] by Dr. Albin Preston for help with management of anxiety and alcohol use. Met with pt. for initial session of 60 minutes to establish contact, to assess symptoms and mental status, identify health behavior goals, and develop plan of care based on readiness to change. Diagnosis: Generalized Anxiety Disorder with panic attacks                      Alcohol use disorder    Symptoms and mental status: Pt appeared stated age, dressed appropriately, and was pleasant and cooperative throughout the interview. Pt made good eye contact; thoughts were clear and goal-oriented and there was no evidence of disturbances in thought process or content or perceptual disturbances. Mood and affect were mildly depressed/anxious. Insight and judgment were good. dE Corado described a history of panic attacks that started suddenly about 2 years ago. He has had very few since then but finds himself feeling anxious a good deal of the time. He had been drinking heavily up until 2 weeks ago, when he started taking Naltrexone prn with a very positive response. He was also started on Ativan and was using them at night mostly to help with possible w/d sx. He has used it on occasion during the day, but stated that he did not feel that it is very effective. In addition to decreasing his etoh use, he has reduced his cigarette smoking from 1 ½ ppd to ½ ppd. We discussed how these factors may initially cause him to feel more anxious. He stated that he enjoys his work and the only major stress in his life is his wifes media addiction; she is on her phone or ipad to the exclusion of housework, childcare, and interacting with him. The scores on the Pt. Stress Questionnaire were: PHQ-9: 9 (mild depressive sx); KEDAR-7: 12 (significant anxiety); AUDIT: 21 (significant abuse of alcohol); and 2/4 on the PTSD scale (related to having a bad reaction to Zoloft).  He rated his current pain level 0/10. Health Behavior Goals: To feel less anxious and to manage drinking better    Social Support: His wife, some friends    Intervention: The following CBT interventions were reviewed: self-monitoring of thoughts that worsen anxiety, relaxation training, and meditation. Discussions related to lifestyle modifications were also reviewed: eating behavior, sleep hygiene, management of stressors, physical activity, pleasurable activities/self-care, social support, positive life goals. He was given information on coping with panic attacks and stress management. Plan: Pt. agreed to follow the treatment plan outlined above and will return in 3 weeks. [facility] will coordinate with PCP for plan of care.

## 2018-04-20 ENCOUNTER — OFFICE VISIT (OUTPATIENT)
Dept: INTERNAL MEDICINE CLINIC | Age: 43
End: 2018-04-20

## 2018-04-20 VITALS
TEMPERATURE: 98.4 F | HEART RATE: 78 BPM | BODY MASS INDEX: 28.26 KG/M2 | HEIGHT: 70 IN | WEIGHT: 197.4 LBS | DIASTOLIC BLOOD PRESSURE: 97 MMHG | RESPIRATION RATE: 16 BRPM | OXYGEN SATURATION: 98 % | SYSTOLIC BLOOD PRESSURE: 159 MMHG

## 2018-04-20 DIAGNOSIS — F41.9 ANXIETY: ICD-10-CM

## 2018-04-20 DIAGNOSIS — F10.11 HISTORY OF ETOH ABUSE: ICD-10-CM

## 2018-04-20 DIAGNOSIS — I10 ESSENTIAL HYPERTENSION: Primary | ICD-10-CM

## 2018-04-20 RX ORDER — VALSARTAN 40 MG/1
TABLET ORAL
Qty: 60 TAB | Refills: 2 | Status: SHIPPED | OUTPATIENT
Start: 2018-04-20 | End: 2018-07-21 | Stop reason: SDUPTHER

## 2018-04-20 RX ORDER — LORAZEPAM 0.5 MG/1
TABLET ORAL
Qty: 20 TAB | Refills: 0 | Status: SHIPPED | OUTPATIENT
Start: 2018-04-20 | End: 2018-05-09 | Stop reason: SDUPTHER

## 2018-04-20 RX ORDER — CITALOPRAM 10 MG/1
TABLET ORAL
Qty: 30 TAB | Refills: 1 | Status: SHIPPED | OUTPATIENT
Start: 2018-04-20 | End: 2018-05-09 | Stop reason: SDUPTHER

## 2018-04-20 NOTE — PROGRESS NOTES
Chief Complaint   Patient presents with    Follow-up     3 week       etoh  Pt reports he was using ativan in the evening to help with sweats and 2-3 times per week for anxiety. He reports his sweats varies. Pt reports naltrexone takes away libido. He took a beer on Tuesday and 2 on Wednesday becaseu of stress. The first week ativan worked but then after that it did not really help with anxiety. Since last visit he had 2 beers on Friday and 1 beer on Wed because he felt like he wanted a beer. Friday was with friends and on Wed got off early and did not take before going home at 3 pm. He does note the naltrexone works really well and no cravings at all. He reports his sweats at night are not as severe. He reports before was drenched and continuous but now slight amount. Last 2 nights very mild. He is taking ativan 2 nights and then 2 days. He took 1/2 pill when could not talk self   Does not feel depressed. Anxious at times    Children are   2, 5 and 6   She enjoys playing soccer and running around with them after work      Subjective:   Ángela Ch is a 38 y.o. male with hypertension. Hypertension ROS: taking medications as instructed, no medication side effects noted, no TIA's, no chest pain on exertion, no dyspnea on exertion, no swelling of ankles. New concerns: none. He notices his blood pressure does elevate at home. History reviewed. No pertinent past medical history.   Past Surgical History:   Procedure Laterality Date    HX ORTHOPAEDIC      knee surgery     Social History     Social History    Marital status: UNKNOWN     Spouse name: N/A    Number of children: N/A    Years of education: N/A     Social History Main Topics    Smoking status: Current Every Day Smoker     Packs/day: 1.00     Years: 20.00     Types: Cigarettes    Smokeless tobacco: Never Used    Alcohol use 1.8 oz/week     3 Cans of beer per week      Comment: 3 beers/day, 5 on friday and sat    Drug use: No    Sexual activity: Yes     Partners: Female     Other Topics Concern    None     Social History Narrative    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 60 Tab 3    naltrexone (DEPADE) 50 mg tablet Take 1 Tab by mouth daily. 1-2 hours prior to etoh  Indications: alcoholism 30 Tab 1    LORazepam (ATIVAN) 0.5 mg tablet Take 1/2 to 1 tablet po bid only if needed do not drink etoh or other sedative meds 20 Tab 0     No Known Allergies    Review of Systems - General ROS: positive for  - sleep disturbance  negative for - chills or fever  Cardiovascular ROS: no chest pain or dyspnea on exertion  Respiratory ROS: no cough, shortness of breath, or wheezing    Visit Vitals    BP (!) 159/97 (BP 1 Location: Left arm, BP Patient Position: Sitting)    Pulse 78    Temp 98.4 °F (36.9 °C) (Oral)    Resp 16    Ht 5' 10\" (1.778 m)    Wt 197 lb 6.4 oz (89.5 kg)    SpO2 98%    BMI 28.32 kg/m2     General Appearance:  Well developed, well nourished,alert and oriented x 3, and individual in no acute distress. Ears/Nose/Mouth/Throat:   Hearing grossly normal.         Neck: Supple, no lad, no bruits   Chest:   Lungs clear to auscultation bilaterally. Cardiovascular:  Regular rate and rhythm, S1, S2 normal, no murmur. Abdomen:   Soft, non-tender, bowel sounds are active. Extremities: No edema bilaterally. Skin: Warm and dry, no suspicious lesions                 Diagnoses and all orders for this visit:    1.  Essential hypertension  Not optimally controlled  Use of etoh can cause labile bp and educated pt on this  -     valsartan (DIOVAN) 40 mg tablet; Take 1 po in the am and may increase to evening dose for 2 tabs/day    2. Anxiety  Not optimally controlled  Follow up in 2 months  Cont work with [facility]  -     citalopram (CELEXA) 10 mg tablet; Take 1/2 tab po but may increase to 1 tab daily  -     LORazepam (ATIVAN) 0.5 mg tablet; Take 1/2 to 1 tablet po bid only if needed do not drink etoh or other sedative meds    3. History of ETOH abuse  Requested pt to continue naltrexone  Abstain at least 3 months  followu in 2months  Highly motivite    This note will not be viewable in [address]

## 2018-04-20 NOTE — MR AVS SNAPSHOT
[facility] 
 
 
 [address] [place] [address] 
[phone] Patient: Cecil Dwyer MRN: [id number] :1975 Visit Information Date & Time Provider Department Dept. Phone Encounter #  
 2018  8:00 AM Manuelito Gardner MD [facility] [address] [id number] Upcoming Health Maintenance Date Due Pneumococcal 19-64 Medium Risk (1 of 1 - PPSV23) 1996 DTaP/Tdap/Td series (1 - Tdap) 1998 Influenza Age 5 to Adult 2017 Allergies as of 2018  Review Complete On: 2018 By: Manuelito Gardner MD  
 No Known Allergies Current Immunizations  Never Reviewed No immunizations on file. Not reviewed this visit You Were Diagnosed With   
  
 Codes Comments Essential hypertension    -  Primary ICD-10-CM: I10 
ICD-9-CM: 401.9 Anxiety     ICD-10-CM: F41.9 ICD-9-CM: 300.00 History of ETOH abuse     ICD-10-CM: Z87.898 ICD-9-CM: 305.03 Vitals BP Pulse Temp Resp Height(growth percentile) Weight(growth percentile) (!) 159/97 (BP 1 Location: Left arm, BP Patient Position: Sitting) 78 98.4 °F (36.9 °C) (Oral) 16 5' 10\" (1.778 m) 197 lb 6.4 oz (89.5 kg) SpO2 BMI Smoking Status 98% 28.32 kg/m2 Current Every Day Smoker BMI and BSA Data Body Mass Index Body Surface Area  
 28.32 kg/m 2 2.1 m 2 Preferred Pharmacy Pharmacy Name Phone [id number] - [address], [address] [facility] [phone] Your Updated Medication List  
  
   
This list is accurate as of 18  8:40 AM.  Always use your most recent med list.  
  
  
  
  
 citalopram 10 mg tablet Commonly known as:  Margreta Isela Take 1/2 tab po but may increase to 1 tab daily  
  
 cloNIDine HCl 0.1 mg tablet Commonly known as:  CATAPRES Take 1 Tab by mouth two (2) times a day. LORazepam 0.5 mg tablet Commonly known as:  ATIVAN Take 1/2 to 1 tablet po bid only if needed do not drink etoh or other sedative meds  
  
 naltrexone 50 mg tablet Commonly known as:  DEPADE Take 1 Tab by mouth daily. 1-2 hours prior to etoh  Indications: alcoholism  
  
 valsartan 40 mg tablet Commonly known as:  DIOVAN Take 1 po in the am and may increase to evening dose for 2 tabs/day Prescriptions Printed Refills LORazepam (ATIVAN) 0.5 mg tablet 0 Sig: Take 1/2 to 1 tablet po bid only if needed do not drink etoh or other sedative meds Class: Print Prescriptions Sent to Pharmacy Refills  
 valsartan (DIOVAN) 40 mg tablet 2 Sig: Take 1 po in the am and may increase to evening dose for 2 tabs/day Class: Normal  
 Pharm[facility]: [address] [place] [address] [id number] [facility]: [phone]  
 citalopram (CELEXA) 10 mg tablet 1 Sig: Take 1/2 tab po but may increase to 1 tab daily Class: Normal  
 Pharmacy: [address] [place] [address] [address], [address] [place] [address] Ph #: [phone] [facility] [place] [facility] SERVICES! Dear Matthew Davila: 
Thank you for requesting a [employer] account. Our records indicate that you already have an active [employer] account. You can access your account anytime at [email or web address] Did you know that you can access your hospital and ER discharge instructions at any time in [employer]? You can also review all of your test results from your hospital stay or ER visit. Additional Information If you have questions, please visit the Frequently Asked Questions section of the [employer] website at [email or web address] Remember, [employer] is NOT to be used for urgent needs. For medical emergencies, dial 911. Now available from your iPhone and Android! Please provide this summary of care documentation to your next provider. If you have any questions after today's visit, please call [phone].

## 2018-04-20 NOTE — PROGRESS NOTES
Thi Morales is a 38 y.o. male    Chief Complaint   Patient presents with    Follow-up     3 week       1. Have you been to the ER, urgent care clinic since your last visit? Hospitalized since your last visit? No    2. Have you seen or consulted any other health care providers outside of the [address] [place] [address] since your last visit? Include any pap smears or colon screening.  No

## 2018-05-09 ENCOUNTER — OFFICE VISIT (OUTPATIENT)
Dept: INTERNAL MEDICINE CLINIC | Age: 43
End: 2018-05-09

## 2018-05-09 VITALS
BODY MASS INDEX: 28.2 KG/M2 | HEART RATE: 74 BPM | WEIGHT: 197 LBS | HEIGHT: 70 IN | RESPIRATION RATE: 16 BRPM | DIASTOLIC BLOOD PRESSURE: 98 MMHG | OXYGEN SATURATION: 98 % | TEMPERATURE: 98.3 F | SYSTOLIC BLOOD PRESSURE: 144 MMHG

## 2018-05-09 DIAGNOSIS — I10 ESSENTIAL HYPERTENSION: ICD-10-CM

## 2018-05-09 DIAGNOSIS — F10.20 UNCOMPLICATED ALCOHOL DEPENDENCE (HCC): Primary | ICD-10-CM

## 2018-05-09 DIAGNOSIS — F41.9 ANXIETY: ICD-10-CM

## 2018-05-09 RX ORDER — CITALOPRAM 20 MG/1
20 TABLET, FILM COATED ORAL DAILY
Qty: 30 TAB | Refills: 1 | Status: SHIPPED | OUTPATIENT
Start: 2018-05-09 | End: 2018-07-09 | Stop reason: SDUPTHER

## 2018-05-09 RX ORDER — LORAZEPAM 0.5 MG/1
TABLET ORAL
Qty: 5 TAB | Refills: 0 | Status: SHIPPED | OUTPATIENT
Start: 2018-05-09 | End: 2018-06-08 | Stop reason: SDUPTHER

## 2018-05-09 NOTE — PROGRESS NOTES
Linda Zaragoza is a 38 y.o. male    Chief Complaint   Patient presents with    Hypertension     2 week       1. Have you been to the ER, urgent care clinic since your last visit? Hospitalized since your last visit? no    2. Have you seen or consulted any other health care providers outside of the [address] [place] [address] since your last visit? Include any pap smears or colon screening.   No    Visit Vitals    BP (!) 156/98 (BP 1 Location: Left arm, BP Patient Position: Sitting)    Pulse 74    Temp 98.3 °F (36.8 °C) (Oral)    Resp 16    Ht 5' 10\" (1.778 m)    Wt 197 lb (89.4 kg)    SpO2 98%    BMI 28.27 kg/m2

## 2018-05-09 NOTE — MR AVS SNAPSHOT
[address] [facility], [place], operated [facility] 
 
 
 [address] Sofia Garcia 100[address] [facility] 
[phone] Patient: Marco A Joyce MRN: [id number] :1975 Visit Information Date & Time Provider Department Dept. Phone Encounter #  
 2018 10:40 AM Elmo Marie MD [facility] [id number] Your Appointments 5/10/2018  4:00 PM  
COUNSELING with Jennifer Barreto, ASH [facility] ([address]) [address] [phone]  
[phone]  
  
   
 [address] [facility] [address] Upcoming Health Maintenance Date Due Pneumococcal 19-64 Medium Risk (1 of 1 - PPSV23) 1996 DTaP/Tdap/Td series (1 - Tdap) 1998 Influenza Age 5 to Adult 2018 Allergies as of 2018  Review Complete On: 2018 By: Arleth Bardales No Known Allergies Current Immunizations  Never Reviewed No immunizations on file. Not reviewed this visit You Were Diagnosed With   
  
 Codes Comments Anxiety     ICD-10-CM: F41.9 ICD-9-CM: 300.00 Vitals BP Pulse Temp Resp Height(growth percentile) Weight(growth percentile) (!) 156/98 (BP 1 Location: Left arm, BP Patient Position: Sitting) 74 98.3 °F (36.8 °C) (Oral) 16 5' 10\" (1.778 m) 197 lb (89.4 kg) SpO2 BMI Smoking Status 98% 28.27 kg/m2 Current Every Day Smoker Vitals History BMI and BSA Data Body Mass Index Body Surface Area  
 28.27 kg/m 2 2.1 m 2 Preferred Pharmacy Pharmacy Name Phone [facility][id number] - [address], 1 [facility] [phone] Your Updated Medication List  
  
   
This list is accurate as of 18 11:16 AM.  Always use your most recent med list.  
  
  
  
  
 citalopram 20 mg tablet Commonly known as:  Sandy Penaloza Take 1 Tab by mouth daily. cloNIDine HCl 0.1 mg tablet Commonly known as:  CATAPRES Take 1 Tab by mouth two (2) times a day. LORazepam 0.5 mg tablet Commonly known as:  ATIVAN Take 1/2 to 1 tablet po bid only if needed do not drink etoh or other sedative meds  
  
 naltrexone 50 mg tablet Commonly known as:  DEPADE Take 1 Tab by mouth daily. 1-2 hours prior to etoh  Indications: alcoholism  
  
 valsartan 40 mg tablet Commonly known as:  DIOVAN Take 1 po in the am and may increase to evening dose for 2 tabs/day Prescriptions Printed Refills LORazepam (ATIVAN) 0.5 mg tablet 0 Sig: Take 1/2 to 1 tablet po bid only if needed do not drink etoh or other sedative meds Class: Print Prescriptions Sent to Pharmacy Refills  
 citalopram (CELEXA) 20 mg tablet 1 Sig: Take 1 Tab by mouth daily. Class: Normal  
 Pharmacy: [facility] [address], [address] [place] [address] Ph #: [phone] Route: Oral  
  
Introducing [facility] SERVICES! Dear Raheem Wadsworth: 
Thank you for requesting a Papaya[employer] account. Our records indicate that you already have an active Papaya[employer] account. You can access your account anytime at [email or web address] Did you know that you can access your hospital and ER discharge instructions at any time in PapayaMobile? You can also review all of your test results from your hospital stay or ER visit. Additional Information If you have questions, please visit the Frequently Asked Questions section of the Papaya[employer] website at [email or web address] Remember, PapayaMobile is NOT to be used for urgent needs. For medical emergencies, dial 911. Now available from your iPhone and Android! Please provide this summary of care documentation to your next provider. If you have any questions after today's visit, please call [phone].

## 2018-05-09 NOTE — PROGRESS NOTES
Chief Complaint   Patient presents with    Hypertension     2 week     Anxiety  Improved  Thinks celexa giving him smoother control of anxiety  MH 9/10, not irritible, energy level 8/10      etoh use  Pt reports that he has no etoh 3-4 days but then well have one night with  1-2 at an event. He notes  before would drink 5-6 beers. He does not take naltrexone every night and in fact the days he is going to drink he does not take his naltrexone. No sE of naltrexone  Meeting with [employer] went well. His friends and peers are aware he is not drinking as much. He reports he does feel better when he does not drink. Subjective:   Gretat Reyna is a 38 y.o. male with hypertension. Hypertension ROS: taking medications as instructed, no medication side effects noted, no TIA's, no chest pain on exertion, no dyspnea on exertion, no swelling of ankles. New concerns: none. He notices his blood pressure at home after work is 144/86 but starts to go down quickly to 120-130 range. No chest pain, no sob          History reviewed. No pertinent past medical history.   Past Surgical History:   Procedure Laterality Date    HX ORTHOPAEDIC      knee surgery     Social History     Social History    Marital status: UNKNOWN     Spouse name: N/A    Number of children: N/A    Years of education: N/A     Social History Main Topics    Smoking status: Current Every Day Smoker     Packs/day: 1.00     Years: 20.00     Types: Cigarettes    Smokeless tobacco: Never Used    Alcohol use 1.8 oz/week     3 Cans of beer per week      Comment: 3 beers/day, 5 on friday and sat    Drug use: No    Sexual activity: Yes     Partners: Female     Other Topics Concern    None     Social History Narrative    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    Landscaping             wife healthy Denisha Budge    3 children : 2, 4, 5yo     Family History   Problem Relation Age of Onset    Thyroid Disease Mother     Migraines Sister     Anxiety Sister     Psychiatric Disorder Paternal Grandmother      Current Outpatient Prescriptions   Medication Sig Dispense Refill    valsartan (DIOVAN) 40 mg tablet Take 1 po in the am and may increase to evening dose for 2 tabs/day 60 Tab 2    citalopram (CELEXA) 10 mg tablet Take 1/2 tab po but may increase to 1 tab daily 30 Tab 1    LORazepam (ATIVAN) 0.5 mg tablet Take 1/2 to 1 tablet po bid only if needed do not drink etoh or other sedative meds 20 Tab 0    cloNIDine HCl (CATAPRES) 0.1 mg tablet Take 1 Tab by mouth two (2) times a day. 60 Tab 3    naltrexone (DEPADE) 50 mg tablet Take 1 Tab by mouth daily. 1-2 hours prior to etoh  Indications: alcoholism 30 Tab 1     No Known Allergies    Review of Systems - General ROS: positive for  - sleep disturbance  negative for - chills or fever  Cardiovascular ROS: no chest pain or dyspnea on exertion  Respiratory ROS: no cough, shortness of breath, or wheezing    Visit Vitals    BP (!) 144/98 (BP 1 Location: Left arm, BP Patient Position: Sitting)    Pulse 74    Temp 98.3 °F (36.8 °C) (Oral)    Resp 16    Ht 5' 10\" (1.778 m)    Wt 197 lb (89.4 kg)    SpO2 98%    BMI 28.27 kg/m2     General Appearance:  Well developed, well nourished,alert and oriented x 3, and individual in no acute distress. Ears/Nose/Mouth/Throat:   Hearing grossly normal.         Neck: Supple, no lad, no bruits   Chest:   Lungs clear to auscultation bilaterally. Cardiovascular:  Regular rate and rhythm, S1, S2 normal, no murmur. Abdomen:   Soft, non-tender, bowel sounds are active. Extremities: No edema bilaterally. Skin: Warm and dry, no suspicious lesions                 Diagnoses and all orders for this visit:    1.  Uncomplicated alcohol dependence ([address]  He is still having breakthrough but associated with noncompliance with naltrexone  Will see José Miguel Martinez tomorrow   I encouraged him to not drink for at least 3 weeks and take his naltrexone daily as rx'd Discussed the longer away from etoh hopefully less risk to go back  Follow up in 1 month    2. Anxiety  Not controlled  Will increase celexa and wean off ativan. He will hold this 5 tab rx . He has 3 lorazepam at home  -     citalopram (CELEXA) 20 mg tablet; Take 1 Tab by mouth daily.  -     LORazepam (ATIVAN) 0.5 mg tablet; Take 1/2 to 1 tablet po bid only if needed do not drink etoh or other sedative meds    3. Essential hypertension  Improving   Will monitor    Follow up in 1 month    This note will not be viewable in MyChart. mychart me re: abstainance in 2 weeks.

## 2018-06-08 ENCOUNTER — OFFICE VISIT (OUTPATIENT)
Dept: INTERNAL MEDICINE CLINIC | Age: 43
End: 2018-06-08

## 2018-06-08 VITALS
SYSTOLIC BLOOD PRESSURE: 131 MMHG | HEART RATE: 77 BPM | HEIGHT: 70 IN | RESPIRATION RATE: 16 BRPM | OXYGEN SATURATION: 95 % | TEMPERATURE: 98.4 F | BODY MASS INDEX: 27.92 KG/M2 | WEIGHT: 195 LBS | DIASTOLIC BLOOD PRESSURE: 87 MMHG

## 2018-06-08 DIAGNOSIS — F10.20 UNCOMPLICATED ALCOHOL DEPENDENCE (HCC): ICD-10-CM

## 2018-06-08 DIAGNOSIS — F41.9 ANXIETY: ICD-10-CM

## 2018-06-08 DIAGNOSIS — I10 ESSENTIAL HYPERTENSION: Primary | ICD-10-CM

## 2018-06-08 DIAGNOSIS — L98.8 SKIN MACULE: ICD-10-CM

## 2018-06-08 RX ORDER — BUPROPION HYDROCHLORIDE 150 MG/1
150 TABLET, EXTENDED RELEASE ORAL DAILY
Qty: 30 TAB | Refills: 0 | Status: SHIPPED | OUTPATIENT
Start: 2018-06-08 | End: 2018-07-06 | Stop reason: SDUPTHER

## 2018-06-08 RX ORDER — NALTREXONE HYDROCHLORIDE 50 MG/1
50 TABLET, FILM COATED ORAL DAILY
Qty: 90 TAB | Refills: 0 | Status: SHIPPED | OUTPATIENT
Start: 2018-06-08 | End: 2019-01-16 | Stop reason: SDUPTHER

## 2018-06-08 RX ORDER — LORAZEPAM 0.5 MG/1
TABLET ORAL
Qty: 22 TAB | Refills: 0 | Status: SHIPPED | OUTPATIENT
Start: 2018-06-08 | End: 2019-01-16 | Stop reason: ALTCHOICE

## 2018-06-08 NOTE — MR AVS SNAPSHOT
[facility] 
 
 
 [address] 
[phone] Patient: Chad Mcgrath MRN: [id number] :1975 Visit Information Date & Time Provider Department Dept. Phone Encounter #  
 2018  8:00 AM Carly Dyer MD [facility] [address] [facility] [id number] Upcoming Health Maintenance Date Due Pneumococcal 19-64 Medium Risk (1 of 1 - PPSV23) 1996 DTaP/Tdap/Td series (1 - Tdap) 1998 Influenza Age 5 to Adult 2018 Allergies as of 2018  Review Complete On: 2018 By: Carly Dyer MD  
 No Known Allergies Current Immunizations  Never Reviewed No immunizations on file. Not reviewed this visit You Were Diagnosed With   
  
 Codes Comments Essential hypertension    -  Primary ICD-10-CM: I10 
ICD-9-CM: 401.9 Anxiety     ICD-10-CM: F41.9 ICD-9-CM: 300.00 Uncomplicated alcohol dependence ([address]     ICD-10-CM: F10.20 ICD-9-CM: 303.90 Skin macule     ICD-10-CM: L98.8 ICD-9-CM: 709.8 Vitals BP Pulse Temp Resp Height(growth percentile) Weight(growth percentile) 131/87 (BP 1 Location: Left arm, BP Patient Position: Sitting) 77 98.4 °F (36.9 °C) (Oral) 16 5' 10\" (1.778 m) 195 lb (88.5 kg) SpO2 BMI Smoking Status 95% 27.98 kg/m2 Current Every Day Smoker BMI and BSA Data Body Mass Index Body Surface Area  
 27.98 kg/m 2 2.09 m 2 Preferred Pharmacy Pharmacy Name Phone [facility] - [address] [facility], 1 [facility] [phone] Your Updated Medication List  
  
   
This list is accurate as of 18  8:47 AM.  Always use your most recent med list.  
  
  
  
  
 buPROPion  mg SR tablet Commonly known as:  Yanet Woodruff Take 1 Tab by mouth daily. citalopram 20 mg tablet Commonly known as:  Andrea Singh Take 1 Tab by mouth daily. cloNIDine HCl 0.1 mg tablet Commonly known as:  CATAPRES Take 1 Tab by mouth two (2) times a day. LORazepam 0.5 mg tablet Commonly known as:  ATIVAN Take 1/2 to 1 tablet po daily only if needed do not drink etoh or other sedative meds  
  
 naltrexone 50 mg tablet Commonly known as:  DEPADE Take 1 Tab by mouth daily. 1-2 hours prior to etoh  Indications: alcoholism  
  
 valsartan 40 mg tablet Commonly known as:  DIOVAN Take 1 po in the am and may increase to evening dose for 2 tabs/day Prescriptions Printed Refills LORazepam (ATIVAN) 0.5 mg tablet 0 Sig: Take 1/2 to 1 tablet po daily only if needed do not drink etoh or other sedative meds Class: Print buPROPion SR (WELLBUTRIN SR) 150 mg SR tablet 0 Sig: Take 1 Tab by mouth daily. Class: Print Route: Oral  
  
Prescriptions Sent to Pharmacy Refills  
 naltrexone (DEPADE) 50 mg tablet 0 Sig: Take 1 Tab by mouth daily. 1-2 hours prior to etoh  Indications: alcoholism Class: Normal  
 Pharmacy: [address] #: [phone] Route: Oral  
  
We Performed the Following METABOLIC PANEL, COMPREHENSIVE [05876 CPT(R)] REFERRAL TO DERMATOLOGY [REF19 Custom] REFERRAL TO PSYCHIATRY [REF91 Custom] Comments:  
 Medication management Referral Information Referral ID Referred By Referred To  
  
 [id number] Tiffany RASCON 141   
   [address], [address] [place] [address] Phone: [phone] Fax: [phone] Visits Status Start Date End Date 1 New Request 6/8/18 6/8/19 If your referral has a status of pending review or denied, additional information will be sent to support the outcome of this decision. Referral ID Referred By Referred To  
 [id number] Yudelka Pérez MD  
   [address], [address] Phone: [phone] Fax: [phone] Visits Status Start Date End Date 1 New Request 6/8/18 6/8/19 If your referral has a status of pending review or denied, additional information will be sent to support the outcome of this decision. Introducing hospitals & HEALTH SERVICES! Dear Nael Ji: 
Thank you for requesting a mangofizz jobs account. Our records indicate that you already have an active mangofizz jobs account. You can access your account anytime at [email or web address] Did you know that you can access your hospital and ER discharge instructions at any time in mangofizz jobs? You can also review all of your test results from your hospital stay or ER visit. Additional Information If you have questions, please visit the Frequently Asked Questions section of the mangofizz jobs website at [email or web address] Remember, mangofizz jobs is NOT to be used for urgent needs. For medical emergencies, dial 911. Now available from your iPhone and Android! Please provide this summary of care documentation to your next provider. If you have any questions after today's visit, please call [phone].

## 2018-06-08 NOTE — PROGRESS NOTES
Chief Complaint   Patient presents with    Medication Evaluation     Anxiety  Pt is taking celexa 20 mg daily. He has se of ED which is problematic to him but still is taking the medication. He reports he has not been using the lorazepam for the past 3 weeks. He has noticed he has become irritable for no reason after taking his naltrexone. He denies anxiety. He reports he does have anxiety in the am before going to work but when he is at work he is fine and works very well. He has good rapport with customers and collegues. etoh use  He has been drinking etoh 3-4 beers at night 3 days of the week. He does not have any desire when he takes the naltrexone. When he does not take the naltrexone he drinks. He notes for the past 25 years he would have a beer after work to relax. He would like to stop drinking alcohol altogether. As noted above the naltrexone is making him irritible. Subjective:   Cristal Rodriguez is a 38 y.o. male with hypertension. Hypertension ROS: taking medications as instructed, no medication side effects noted, no TIA's, no chest pain on exertion, no dyspnea on exertion, no swelling of ankles. New concerns: none. He notices his blood pressure at home after work is 130-140/86 but starts to go down quickly to 120-130 range. No chest pain, no sob          History reviewed. No pertinent past medical history.   Past Surgical History:   Procedure Laterality Date    HX ORTHOPAEDIC      knee surgery     Social History     Social History    Marital status: UNKNOWN     Spouse name: N/A    Number of children: N/A    Years of education: N/A     Social History Main Topics    Smoking status: Current Every Day Smoker     Packs/day: 1.00     Years: 20.00     Types: Cigarettes    Smokeless tobacco: Never Used    Alcohol use 1.8 oz/week     3 Cans of beer per week      Comment: 3 beers/day, 5 on friday and sat    Drug use: No    Sexual activity: Yes     Partners: Female     Other Topics Concern    None     Social History Narrative    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 30 Tab 1    valsartan (DIOVAN) 40 mg tablet Take 1 po in the am and may increase to evening dose for 2 tabs/day 60 Tab 2    cloNIDine HCl (CATAPRES) 0.1 mg tablet Take 1 Tab by mouth two (2) times a day. 60 Tab 3    naltrexone (DEPADE) 50 mg tablet Take 1 Tab by mouth daily. 1-2 hours prior to etoh  Indications: alcoholism 30 Tab 1    LORazepam (ATIVAN) 0.5 mg tablet Take 1/2 to 1 tablet po bid only if needed do not drink etoh or other sedative meds 5 Tab 0     No Known Allergies    Review of Systems - General ROS: positive for  - sleep disturbance  negative for - chills or fever  Cardiovascular ROS: no chest pain or dyspnea on exertion  Respiratory ROS: no cough, shortness of breath, or wheezing    Visit Vitals    /87 (BP 1 Location: Left arm, BP Patient Position: Sitting)    Pulse 77    Temp 98.4 °F (36.9 °C) (Oral)    Resp 16    Ht 5' 10\" (1.778 m)    Wt 195 lb (88.5 kg)    SpO2 95%    BMI 27.98 kg/m2     General Appearance:  Well developed, well nourished,alert and oriented x 3, and individual in no acute distress. Ears/Nose/Mouth/Throat:   Hearing grossly normal.         Neck: Supple, no lad, no bruits   Chest:   Lungs clear to auscultation bilaterally. Cardiovascular:  Regular rate and rhythm, S1, S2 normal, no murmur. Abdomen:   Soft, non-tender, bowel sounds are active. Extremities: No edema bilaterally.     Skin: Warm and dry, no suspicious lesions                 Diagnoses and all orders for this visit: 1. Essential hypertension  Controlled  Cont meds  -     METABOLIC PANEL, COMPREHENSIVE    2. Anxiety  He reports being irritible which may be related to naltrexone (4% uptodate) or related to his increase in etoh intake. It is noted that he did not have the irritibility when he initally started naltrexone but when lorazepam was stopped this is when his sx occurred. If he does not take his naltrexone he drinks so will cover him with lorazepam for now but I did discuss with him we need to find out what the source of his irritibility is. I asked him to talk to Grzegorz Enrique and see what she thinks. I also asked him to see psychiatry for medication management. He does have some ED. We discussed wellbutrin as well to offset some sx. He will try the lorazepam first and then if feels better will start the wellbutrin. He knows I want him off the lorazepam so knows this is limited. -     LORazepam (ATIVAN) 0.5 mg tablet; Take 1/2 to 1 tablet po daily only if needed do not drink etoh or other sedative meds  -     buPROPion SR (WELLBUTRIN SR) 150 mg SR tablet; Take 1 Tab by mouth daily.  -     REFERRAL TO PSYCHIATRY  -     METABOLIC PANEL, COMPREHENSIVE    3. Uncomplicated alcohol dependence ([address]  See above  -     naltrexone (DEPADE) 50 mg tablet; Take 1 Tab by mouth daily. 1-2 hours prior to etoh  Indications: alcoholism  Will check liver proteine on this medication  Cont mvi    4. Skin macule  Tan skin, out in summer sun. He needs screening  -     REFERRAL TO DERMATOLOGY    This note will not be viewable in MyChart.

## 2018-06-21 ENCOUNTER — OFFICE VISIT (OUTPATIENT)
Dept: INTERNAL MEDICINE CLINIC | Age: 43
End: 2018-06-21

## 2018-06-21 DIAGNOSIS — F41.1 GENERALIZED ANXIETY DISORDER: Primary | ICD-10-CM

## 2018-06-29 NOTE — PROGRESS NOTES
Behavioral Health Progress Note    [facility] 3:30[facility] for a total of 30 minutes. Psychotropic medication changes: None    Diagnosis: Generalized Anxiety Disorder with panic attacks                      Alcohol use disorder    Description of session/progress/interventions: Hardeep Herndon reported that he was doing about the same. He is taking the Naltrexone only 2-3 times per week. When he takes it, it works well to decrease or eliminate cravings. He intentionally does not take it on days when he is frustrated with his wife, as he knows that she has been on the computer all day and the kids have been watching tv all day. We discussed trying to increase the number of days he takes the naltrexone and to identify issues he would like to discuss in the couples session planned with his wife in 2 weeks. Assessment/Plan: Hardeep Herndon agreed to increase the number of days of sobriety and to practice relaxation skills prior to going home to help decrease frustration. Will return with his wife in 2 weeks.

## 2018-07-24 RX ORDER — IRBESARTAN 75 MG/1
75 TABLET ORAL
Qty: 30 TAB | Refills: 1 | Status: SHIPPED | OUTPATIENT
Start: 2018-07-24 | End: 2018-09-26 | Stop reason: SDUPTHER

## 2018-09-06 DIAGNOSIS — F41.9 ANXIETY: ICD-10-CM

## 2018-09-07 RX ORDER — CITALOPRAM 20 MG/1
20 TABLET, FILM COATED ORAL DAILY
Qty: 30 TAB | Refills: 1 | Status: SHIPPED | OUTPATIENT
Start: 2018-09-07 | End: 2018-11-27 | Stop reason: SDUPTHER

## 2018-09-07 NOTE — TELEPHONE ENCOUNTER
From: Kaz Escalona  To: Jeanne Morel MD  Sent: 9/6/2018 5:42 PM EDT  Subject: Medication Renewal Request    Original authorizing provider: MD Kaz Nieto would like a refill of the following medications:  citalopram (CELEXA) 20 mg tablet Jeanne Morel MD]    Preferred pharmacy: [address] [place] [address] #[id number] - [address] [place]    Comment:

## 2018-09-26 RX ORDER — IRBESARTAN 75 MG/1
75 TABLET ORAL
Qty: 30 TAB | Refills: 1 | Status: SHIPPED | OUTPATIENT
Start: 2018-09-26 | End: 2018-12-04 | Stop reason: SDUPTHER

## 2018-09-26 NOTE — TELEPHONE ENCOUNTER
From: Sharmin Talavera  To: Rosa Szymanski MD  Sent: 9/26/2018 5:31 AM EDT  Subject: Medication Renewal Request    Original authorizing provider: MD Sharmin Horn would like a refill of the following medications:  irbesartan (AVAPRO) 75 mg tablet Rosa Szymanski MD]    Preferred pharmacy: [facility] #[id number] - [address] [facility]    Comment:

## 2018-11-06 DIAGNOSIS — F41.9 ANXIETY: ICD-10-CM

## 2018-11-07 DIAGNOSIS — I10 ESSENTIAL HYPERTENSION: ICD-10-CM

## 2018-11-07 RX ORDER — CLONIDINE HYDROCHLORIDE 0.1 MG/1
0.1 TABLET ORAL 2 TIMES DAILY
Qty: 60 TAB | Refills: 3 | Status: SHIPPED | OUTPATIENT
Start: 2018-11-07 | End: 2019-01-16 | Stop reason: ALTCHOICE

## 2018-11-07 RX ORDER — BUPROPION HYDROCHLORIDE 150 MG/1
150 TABLET, EXTENDED RELEASE ORAL DAILY
Qty: 90 TAB | Refills: 0 | Status: SHIPPED | OUTPATIENT
Start: 2018-11-07 | End: 2019-01-16 | Stop reason: SDUPTHER

## 2018-11-27 DIAGNOSIS — F41.9 ANXIETY: ICD-10-CM

## 2018-11-28 RX ORDER — CITALOPRAM 20 MG/1
20 TABLET, FILM COATED ORAL DAILY
Qty: 30 TAB | Refills: 1 | Status: SHIPPED | OUTPATIENT
Start: 2018-11-28 | End: 2018-11-29 | Stop reason: ALTCHOICE

## 2019-01-16 ENCOUNTER — OFFICE VISIT (OUTPATIENT)
Dept: INTERNAL MEDICINE CLINIC | Age: 44
End: 2019-01-16

## 2019-01-16 VITALS
SYSTOLIC BLOOD PRESSURE: 161 MMHG | WEIGHT: 211.4 LBS | TEMPERATURE: 98.6 F | HEIGHT: 70 IN | OXYGEN SATURATION: 98 % | RESPIRATION RATE: 16 BRPM | HEART RATE: 92 BPM | BODY MASS INDEX: 30.26 KG/M2 | DIASTOLIC BLOOD PRESSURE: 97 MMHG

## 2019-01-16 DIAGNOSIS — I10 ESSENTIAL HYPERTENSION: Primary | ICD-10-CM

## 2019-01-16 DIAGNOSIS — F41.9 ANXIETY: ICD-10-CM

## 2019-01-16 DIAGNOSIS — F10.20 UNCOMPLICATED ALCOHOL DEPENDENCE (HCC): ICD-10-CM

## 2019-01-16 RX ORDER — BUPROPION HYDROCHLORIDE 150 MG/1
150 TABLET, EXTENDED RELEASE ORAL DAILY
Qty: 90 TAB | Refills: 1 | Status: SHIPPED | OUTPATIENT
Start: 2019-01-16 | End: 2019-07-02 | Stop reason: ALTCHOICE

## 2019-01-16 RX ORDER — CITALOPRAM 20 MG/1
20 TABLET, FILM COATED ORAL DAILY
Qty: 90 TAB | Refills: 1 | Status: SHIPPED | OUTPATIENT
Start: 2019-01-16 | End: 2019-01-17 | Stop reason: SDUPTHER

## 2019-01-16 RX ORDER — IRBESARTAN 75 MG/1
75 TABLET ORAL
Qty: 90 TAB | Refills: 0 | Status: SHIPPED | OUTPATIENT
Start: 2019-01-16 | End: 2019-04-15 | Stop reason: SDUPTHER

## 2019-01-16 RX ORDER — NALTREXONE HYDROCHLORIDE 50 MG/1
50 TABLET, FILM COATED ORAL DAILY
Qty: 90 TAB | Refills: 0 | Status: SHIPPED | OUTPATIENT
Start: 2019-01-16 | End: 2019-07-02 | Stop reason: ALTCHOICE

## 2019-01-16 NOTE — PROGRESS NOTES
Aisha Chauhan is a 41 y.o. male Chief Complaint Patient presents with  Anxiety Pt is here for a f/u for generalized anxiety disorder. 1. Have you been to the ER, urgent care clinic since your last visit? Hospitalized since your last visit? No  
 
2. Have you seen or consulted any other health care providers outside of the [address] [place] [address] since your last visit? Include any pap smears or colon screening. No  
 
 
Health Maintenance Due Topic Date Due  Pneumococcal 19-64 Medium Risk (1 of 1 - PPSV23) 11/12/1996  
 DTaP/Tdap/Td series (1 - Tdap) 11/12/1998  Influenza Age 5 to Adult  08/01/2018 Pt refuses Influenza vaccination. Visit Vitals BP (!) 161/97 (BP 1 Location: Left arm, BP Patient Position: Sitting) Pulse 92 Temp 98.6 °F (37 °C) (Oral) Resp 16 Ht 5' 10\" (1.778 m) Wt 211 lb 6.4 oz (95.9 kg) SpO2 98% BMI 30.33 kg/m²

## 2019-01-16 NOTE — PROGRESS NOTES
Chief Complaint Patient presents with  Anxiety Pt is here for a f/u for generalized anxiety disorder. Patient presents for follow-up with regards to his anxiety medication and alcohol. Anxiety Pt is taking celexa 20 mg daily. He reports the dose is appropriate. He did mention that his wife asked if he should be on the 30 mg. 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 He reports he is not having any panic attack or anxiety like he used to in the past.  He denies side effects of this medication. He does drink 3-4 cups of coffee about 16 ounces per day but it does not seem to affect his sleep. 
 
etoh use He has no longer been drinking etoh 3-4 beers at night 3 days of the week. He does not have any desire when he takes the naltrexone. He has changed his drinking habits to mixers but he has a splash alcohol in the mixer. He has 2-3 of these per night. Again when he takes the naltrexone he has absolutely no desire to drink. Subjective:  
Savannah Palomares is a 41 y.o. male with hypertension. Hypertension ROS: taking medications as instructed, no medication side effects noted, no TIA's, no chest pain on exertion, no dyspnea on exertion, no swelling of ankles. New concerns: none. The patient reports his blood pressure is 120-130/80 at home. He has been out of his medication and so his blood pressure is elevated today along with a busy and crazy day. History reviewed. No pertinent past medical history. Past Surgical History:  
Procedure Laterality Date  HX ORTHOPAEDIC    
 knee surgery Social History Socioeconomic History  Marital status: UNKNOWN Spouse name: Not on file  Number of children: Not on file  Years of education: Not on file  Highest education level: Not on file Tobacco Use  Smoking status: Current Every Day Smoker Packs/day: 1.00 Years: 20.00 Pack years: 20.00 Types: Cigarettes  Smokeless tobacco: Never Used Substance and Sexual Activity  Alcohol use: Yes Alcohol/week: 1.8 oz Types: 3 Cans of beer per week Comment: 3 beers/day, 5 on friday and sat  Drug use: No  
 Sexual activity: Yes  
  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 90 Tab 0  
 naltrexone (DEPADE) 50 mg tablet Take 1 Tab by mouth daily. 1-2 hours prior to etoh 90 Tab 0  
 buPROPion SR (WELLBUTRIN SR) 150 mg SR tablet Take 1 Tab by mouth daily. 90 Tab 1  citalopram (CELEXA) 20 mg tablet Take 1 Tab by mouth daily. 90 Tab 1 No Known Allergies Review of Systems - General ROS: Negative for  - sleep disturbance 
negative for - chills or fever Cardiovascular ROS: no chest pain or dyspnea on exertion Respiratory ROS: no cough, shortness of breath, or wheezing Visit Vitals BP (!) 161/97 (BP 1 Location: Left arm, BP Patient Position: Sitting) Pulse 92 Temp 98.6 °F (37 °C) (Oral) Resp 16 Ht 5' 10\" (1.778 m) Wt 211 lb 6.4 oz (95.9 kg) SpO2 98% BMI 30.33 kg/m² General Appearance:  Well developed, well nourished,alert and oriented x 3, and individual in no acute distress. Ears/Nose/Mouth/Throat:   Hearing grossly normal. 
  
    Neck: Supple, no lad, no bruits Chest:   Lungs clear to auscultation bilaterally. Cardiovascular:  Regular rate and rhythm, S1, S2 normal, no murmur. Abdomen:   Soft, non-tender, bowel sounds are active. Extremities: No edema bilaterally. Skin: Warm and dry, no suspicious lesions Diagnoses and all orders for this visit: 1. Essential hypertension Not controlled but out of medication He will get labs this weekend and will fill his blood pressure medicine today -     METABOLIC PANEL, COMPREHENSIVE 2. Uncomplicated alcohol dependence ([address] Continue use medication is keeping him from increasing quantity of 
-     naltrexone (DEPADE) 50 mg tablet; Take 1 Tab by mouth daily. 1-2 hours prior to etoh 3. Anxiety Continue meds Monitor Celexa use as wife noted he may need an increase in Celexa -     buPROPion SR (WELLBUTRIN SR) 150 mg SR tablet; Take 1 Tab by mouth daily. -     citalopram (CELEXA) 20 mg tablet; Take 1 Tab by mouth daily. Other orders 
-     irbesartan (AVAPRO) 75 mg tablet; Take 1 Tab by mouth nightly.

## 2019-01-17 DIAGNOSIS — F41.9 ANXIETY: ICD-10-CM

## 2019-01-17 RX ORDER — CITALOPRAM 20 MG/1
20 TABLET, FILM COATED ORAL DAILY
Qty: 90 TAB | Refills: 1 | Status: SHIPPED | OUTPATIENT
Start: 2019-01-17 | End: 2019-07-02 | Stop reason: ALTCHOICE

## 2019-03-21 RX ORDER — CLONIDINE HYDROCHLORIDE 0.1 MG/1
0.1 TABLET ORAL 2 TIMES DAILY
Qty: 60 TAB | Refills: 0 | Status: SHIPPED | OUTPATIENT
Start: 2019-03-21 | End: 2019-04-18 | Stop reason: SDUPTHER

## 2019-04-16 RX ORDER — IRBESARTAN 75 MG/1
75 TABLET ORAL
Qty: 90 TAB | Refills: 0 | Status: SHIPPED | OUTPATIENT
Start: 2019-04-16 | End: 2019-05-22 | Stop reason: SDUPTHER

## 2019-04-19 RX ORDER — CLONIDINE HYDROCHLORIDE 0.1 MG/1
0.1 TABLET ORAL 2 TIMES DAILY
Qty: 60 TAB | Refills: 0 | Status: SHIPPED | OUTPATIENT
Start: 2019-04-19 | End: 2019-05-22 | Stop reason: SDUPTHER

## 2019-07-02 ENCOUNTER — OFFICE VISIT (OUTPATIENT)
Dept: INTERNAL MEDICINE CLINIC | Age: 44
End: 2019-07-02

## 2019-07-02 VITALS
BODY MASS INDEX: 30.38 KG/M2 | HEART RATE: 89 BPM | OXYGEN SATURATION: 98 % | RESPIRATION RATE: 12 BRPM | TEMPERATURE: 98.1 F | HEIGHT: 70 IN | WEIGHT: 212.2 LBS | SYSTOLIC BLOOD PRESSURE: 160 MMHG | DIASTOLIC BLOOD PRESSURE: 98 MMHG

## 2019-07-02 DIAGNOSIS — I10 ESSENTIAL HYPERTENSION: Primary | ICD-10-CM

## 2019-07-02 DIAGNOSIS — D75.89 MACROCYTOSIS WITHOUT ANEMIA: ICD-10-CM

## 2019-07-02 RX ORDER — CLONIDINE HYDROCHLORIDE 0.1 MG/1
0.1 TABLET ORAL 2 TIMES DAILY
Qty: 180 TAB | Refills: 1 | Status: SHIPPED | OUTPATIENT
Start: 2019-07-02 | End: 2019-07-18 | Stop reason: SDUPTHER

## 2019-07-02 RX ORDER — IRBESARTAN 75 MG/1
75 TABLET ORAL DAILY
Qty: 90 TAB | Refills: 0 | Status: SHIPPED | OUTPATIENT
Start: 2019-07-02 | End: 2019-07-18 | Stop reason: SDUPTHER
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
## 2019-07-18 DIAGNOSIS — I10 ESSENTIAL HYPERTENSION: ICD-10-CM

## 2019-07-18 RX ORDER — IRBESARTAN 75 MG/1
75 TABLET ORAL DAILY
Qty: 8 TAB | Refills: 0 | Status: SHIPPED | OUTPATIENT
Start: 2019-07-18 | End: 2019-09-30 | Stop reason: SDUPTHER

## 2019-07-18 RX ORDER — CLONIDINE HYDROCHLORIDE 0.1 MG/1
0.1 TABLET ORAL 2 TIMES DAILY
Qty: 16 TAB | Refills: 0 | Status: SHIPPED | OUTPATIENT
Start: 2019-07-18

## 2019-07-18 NOTE — TELEPHONE ENCOUNTER
Patient called to report that he forgot his medication at home while traveling. Patient is requesting 8 days worth of medication be called in to   [facility] [phone]      Please call patient to advise.    [phone]        Clonidine HCI 0.1 MG tablet   Avapro 75 MG tablet

## 2019-09-04 ENCOUNTER — TELEPHONE (OUTPATIENT)
Dept: INTERNAL MEDICINE CLINIC | Age: 44
End: 2019-09-04

## 2019-09-04 NOTE — TELEPHONE ENCOUNTER
Verified with Phillip the authorization has been approved auth # [id number]  1 visit  8/20/19 - 11/20/19-  Faxed approved auth to Katie Landa at [phone]

## 2019-09-04 NOTE — TELEPHONE ENCOUNTER
[facility]  Phone Number: [phone]         MsKota Alenapriscilasam , from [facility], is requesting a referral in regards to the patient's recent visit on 08/20/19 for a vasectomy.      Telephone [phone]   Fax: [phone]

## 2019-09-04 NOTE — TELEPHONE ENCOUNTER
Just FYI - when tried obtaining a referral kept receiving error message INVALID . ..however when requested a pre-authorization patients information pulled up with all correct information and was approved.

## 2019-09-19 ENCOUNTER — TELEPHONE (OUTPATIENT)
Dept: INTERNAL MEDICINE CLINIC | Age: 44
End: 2019-09-19

## 2019-09-19 NOTE — TELEPHONE ENCOUNTER
Josselin is requesting a referral for 6 visits as last referral was only for 1 visit. Pts next appt is 10/11/19. Having surgery next month. Wasn't clear if an insurance or office referral was needed. Thanks.     Maya Fragoso   Urology    Phone [phone]   Fax [phone]     Lulu Rowan [facility] PROVIDER ID [id number]

## 2019-09-20 NOTE — TELEPHONE ENCOUNTER
Patient may need an order from PCP  Submitted referral thru patient's insurance - per patient's insurance   No match - thank you

## 2019-09-30 DIAGNOSIS — I10 ESSENTIAL HYPERTENSION: ICD-10-CM

## 2019-09-30 RX ORDER — IRBESARTAN 75 MG/1
75 TABLET ORAL DAILY
Qty: 30 TAB | Refills: 0 | Status: SHIPPED | OUTPATIENT
Start: 2019-09-30

## 2019-09-30 NOTE — TELEPHONE ENCOUNTER
Josselin with [facility] is following up her ref request for   Dr Enedelia Rome, states patient needs additional visits approved for upcoming surgery .    She can be reached at [address] [facility] [address]

## 2019-10-01 ENCOUNTER — TELEPHONE (OUTPATIENT)
Dept: INTERNAL MEDICINE CLINIC | Age: 44
End: 2019-10-01

## 2019-10-01 NOTE — TELEPHONE ENCOUNTER
Called patient's insurance and was advised patient will need to change PCP on his insurance and everything will process.  Will take up to 72 hours  - thank you

## 2019-10-01 NOTE — TELEPHONE ENCOUNTER
Spoke w/ patient regarding [facility] mix match. Patient stated he spoke w/ someone in customer service and was told everything matched. Patient was advised he will need to call his insurance again and speak with someone in his benefits department to make sure everything was entered correctly not the .  Patient stated he would call his insurance back - thank you

## 2019-10-01 NOTE — TELEPHONE ENCOUNTER
Reached out to Humberto Courser to advise  Does not match on patient's insurance. Advised insurance was ran x3 & still came back invalid  As a courtesy carla Vásquez for a fourth time while on the phone w/ Humberto Courser and still the reply from patient' insurance invalid  Shantel Funk patient was contacted when first began working on referral request  and never received a returned call. Left patient a VM. Humberto Courser stated she will reach out to patient today & her and I will touch bases later today.

## 2019-10-01 NOTE — TELEPHONE ENCOUNTER
PSR: No office referral in [address] [place] [address]. Chuy Singh [facility] with [facility] is requesting a referral to see Dr Nina Francis a urologist on 10/11/19 and no time yet for a vasectomy. Pts number is [phone] and Dr Ford Skill number is [phone] and fax [phone]. [facility] number is [phone].

## 2019-10-07 NOTE — TELEPHONE ENCOUNTER
Processed patient's information again & still comes back invalid [facility]. [facility] on 10/07 with patient's insurance  - thank you

## 2019-10-08 ENCOUNTER — TELEPHONE (OUTPATIENT)
Dept: INTERNAL MEDICINE CLINIC | Age: 44
End: 2019-10-08

## 2019-10-08 NOTE — TELEPHONE ENCOUNTER
----- Message from Maya Burk sent at 10/8/2019 11:32 AM EDT -----  Regarding: Dr. Renetta Wheeler Message/Vendor Calls    Caller's first and last name:      Reason for call: Pt is calling to make sure the referral for [facility] was completed back in September due to the surgery coming up on Friday, October 11th        Callback required yes/no and why: Y      Best contact number(s): [phone]      Details to clarify the request:      Maya Burk

## 2019-10-08 NOTE — TELEPHONE ENCOUNTER
Spoke w/ Alondra Hughes with [facility] regarding patient changing information w / insurance. Patient has to call insurance to list his PCP to obtain referrals and it has not been done. Alondra Hughes stated she will reach out to patient one last attempt. - thank you

## 2019-10-09 NOTE — TELEPHONE ENCOUNTER
Submitted referral request yet again and still referral  will not process . After reaching out to patient's insurance patient has to contact his insurance and change / list PCP only patient can do. Printed a copy of print out of transmitted communication from [employer] with dates to show that it was ran again today. This has been scanned into patient chart.  Reached out to Urology to advise of such Thank you

## 2019-10-21 ENCOUNTER — TELEPHONE (OUTPATIENT)
Dept: INTERNAL MEDICINE CLINIC | Age: 44
End: 2019-10-21

## 2019-10-21 DIAGNOSIS — Z30.8 ENCOUNTER FOR OTHER CONTRACEPTIVE MANAGEMENT: ICD-10-CM

## 2019-10-21 DIAGNOSIS — Z98.52 VASECTOMY STATUS: Primary | ICD-10-CM

## 2019-10-21 NOTE — TELEPHONE ENCOUNTER
Traci Tinoco is requesting an update on pts referral. Wants to know if PCP has been updated with insurance. Thanks.

## 2019-10-22 NOTE — TELEPHONE ENCOUNTER
Referral has been Obtained & Anette Tenorio #  [id number]  No # Visits  - [employer]   Dates Covered  08/30/2019 - 07/28/2020     Returned Marylin's call to advise of such - thank you

## 2020-05-20 DIAGNOSIS — R53.83 FATIGUE, UNSPECIFIED TYPE: Primary | ICD-10-CM

## 2020-05-28 LAB — SARS-COV-2 AB, IGG, CORG1M: NEGATIVE

## 2023-05-22 RX ORDER — CLONIDINE HYDROCHLORIDE 0.1 MG/1
0.1 TABLET ORAL 2 TIMES DAILY
COMMUNITY
Start: 2019-07-18

## 2023-05-22 RX ORDER — IRBESARTAN 75 MG/1
75 TABLET ORAL DAILY
COMMUNITY
Start: 2019-09-30

## 2024-08-29 ENCOUNTER — HOSPITAL ENCOUNTER (OUTPATIENT)
Facility: HOSPITAL | Age: 49
Discharge: HOME | End: 2024-08-29
Attending: PAIN MEDICINE
Payer: COMMERCIAL

## 2024-08-29 DIAGNOSIS — M54.16 LUMBAR RADICULOPATHY: ICD-10-CM

## 2024-08-29 PROCEDURE — 72148 MRI LUMBAR SPINE W/O DYE: CPT
